# Patient Record
Sex: MALE | Race: ASIAN | NOT HISPANIC OR LATINO | Employment: FULL TIME | URBAN - METROPOLITAN AREA
[De-identification: names, ages, dates, MRNs, and addresses within clinical notes are randomized per-mention and may not be internally consistent; named-entity substitution may affect disease eponyms.]

---

## 2023-12-25 ENCOUNTER — HOSPITAL ENCOUNTER (EMERGENCY)
Facility: HOSPITAL | Age: 38
Discharge: HOME | End: 2023-12-25
Attending: STUDENT IN AN ORGANIZED HEALTH CARE EDUCATION/TRAINING PROGRAM | Admitting: STUDENT IN AN ORGANIZED HEALTH CARE EDUCATION/TRAINING PROGRAM
Payer: COMMERCIAL

## 2023-12-25 ENCOUNTER — APPOINTMENT (OUTPATIENT)
Dept: CT IMAGING | Facility: HOSPITAL | Age: 38
End: 2023-12-25
Payer: COMMERCIAL

## 2023-12-25 VITALS
TEMPERATURE: 97.9 F | DIASTOLIC BLOOD PRESSURE: 82 MMHG | SYSTOLIC BLOOD PRESSURE: 119 MMHG | RESPIRATION RATE: 20 BRPM | WEIGHT: 123.46 LBS | HEIGHT: 66 IN | BODY MASS INDEX: 19.84 KG/M2 | HEART RATE: 105 BPM | OXYGEN SATURATION: 97 %

## 2023-12-25 DIAGNOSIS — M54.50 ACUTE MIDLINE LOW BACK PAIN, UNSPECIFIED WHETHER SCIATICA PRESENT: Primary | ICD-10-CM

## 2023-12-25 DIAGNOSIS — M62.838 MUSCLE SPASM: ICD-10-CM

## 2023-12-25 PROCEDURE — 74176 CT ABD & PELVIS W/O CONTRAST: CPT

## 2023-12-25 PROCEDURE — 63710000001 PREDNISONE PER 1 MG: Performed by: STUDENT IN AN ORGANIZED HEALTH CARE EDUCATION/TRAINING PROGRAM

## 2023-12-25 PROCEDURE — 25010000002 KETOROLAC TROMETHAMINE PER 15 MG: Performed by: STUDENT IN AN ORGANIZED HEALTH CARE EDUCATION/TRAINING PROGRAM

## 2023-12-25 PROCEDURE — 72131 CT LUMBAR SPINE W/O DYE: CPT

## 2023-12-25 PROCEDURE — 99284 EMERGENCY DEPT VISIT MOD MDM: CPT

## 2023-12-25 PROCEDURE — 96372 THER/PROPH/DIAG INJ SC/IM: CPT

## 2023-12-25 RX ORDER — METHOCARBAMOL 750 MG/1
750 TABLET, FILM COATED ORAL ONCE
Status: COMPLETED | OUTPATIENT
Start: 2023-12-25 | End: 2023-12-25

## 2023-12-25 RX ORDER — METHOCARBAMOL 500 MG/1
500 TABLET, FILM COATED ORAL 3 TIMES DAILY PRN
Qty: 30 TABLET | Refills: 0 | Status: SHIPPED | OUTPATIENT
Start: 2023-12-25

## 2023-12-25 RX ORDER — KETOROLAC TROMETHAMINE 15 MG/ML
15 INJECTION, SOLUTION INTRAMUSCULAR; INTRAVENOUS ONCE
Status: COMPLETED | OUTPATIENT
Start: 2023-12-25 | End: 2023-12-25

## 2023-12-25 RX ORDER — PREDNISONE 50 MG/1
50 TABLET ORAL DAILY
Qty: 5 TABLET | Refills: 0 | Status: SHIPPED | OUTPATIENT
Start: 2023-12-25

## 2023-12-25 RX ORDER — OXYCODONE AND ACETAMINOPHEN 7.5; 325 MG/1; MG/1
1 TABLET ORAL ONCE
Status: COMPLETED | OUTPATIENT
Start: 2023-12-25 | End: 2023-12-25

## 2023-12-25 RX ORDER — PREDNISONE 20 MG/1
60 TABLET ORAL ONCE
Status: COMPLETED | OUTPATIENT
Start: 2023-12-25 | End: 2023-12-25

## 2023-12-25 RX ORDER — KETOROLAC TROMETHAMINE 10 MG/1
10 TABLET, FILM COATED ORAL EVERY 6 HOURS PRN
Qty: 30 TABLET | Refills: 0 | Status: SHIPPED | OUTPATIENT
Start: 2023-12-25

## 2023-12-25 RX ADMIN — METHOCARBAMOL TABLETS 750 MG: 750 TABLET, COATED ORAL at 19:23

## 2023-12-25 RX ADMIN — OXYCODONE AND ACETAMINOPHEN 1 TABLET: 7.5; 325 TABLET ORAL at 19:23

## 2023-12-25 RX ADMIN — KETOROLAC TROMETHAMINE 15 MG: 15 INJECTION, SOLUTION INTRAMUSCULAR; INTRAVENOUS at 21:36

## 2023-12-25 RX ADMIN — PREDNISONE 60 MG: 20 TABLET ORAL at 19:23

## 2023-12-26 NOTE — ED PROVIDER NOTES
EMERGENCY DEPARTMENT ENCOUNTER    Room Number:  12/12  PCP: System, Provider Not In  History obtained from: Patient      HPI:  Chief Complaint: Low back pain  A complete HPI/ROS/PMH/PSH/SH/FH are unobtainable due to: Not applicable  Context: Mali Orlando is a 38 y.o. male who presents to the ED c/o low back pain.  Onset last night, feels like a spasm.  Difficulty moving due to severe pain.  Reports he has had similar episodes in the past where the muscles in his upper back have locked up and he has been paralyzed for some period.  This is happened to him twice, resolved after 3 to 4 weeks.  Patient denies any numbness or weakness.  No bladder or bowel incontinence.  No known injury.  No radiation down the legs.            PAST MEDICAL HISTORY  Active Ambulatory Problems     Diagnosis Date Noted    No Active Ambulatory Problems     Resolved Ambulatory Problems     Diagnosis Date Noted    No Resolved Ambulatory Problems     No Additional Past Medical History         PAST SURGICAL HISTORY  No past surgical history on file.      FAMILY HISTORY  No family history on file.      SOCIAL HISTORY  Social History     Socioeconomic History    Marital status:          ALLERGIES  Patient has no known allergies.        REVIEW OF SYSTEMS    As per HPI      PHYSICAL EXAM  ED Triage Vitals   Temp Heart Rate Resp BP SpO2   12/25/23 1900 12/25/23 1900 12/25/23 1900 12/25/23 1906 12/25/23 1900   97.9 °F (36.6 °C) 105 20 119/82 97 %      Temp src Heart Rate Source Patient Position BP Location FiO2 (%)   12/25/23 1900 -- -- -- --   Tympanic           Physical Exam  Constitutional:       General: He is not in acute distress.  HENT:      Head: Normocephalic and atraumatic.   Pulmonary:      Effort: No respiratory distress.   Abdominal:      General: There is no distension.      Tenderness: There is no abdominal tenderness.   Musculoskeletal:         General: No swelling or deformity.      Comments: Tenderness palpation diffusely over  the lumbar spine, primarily over the lumbar sacral area, pain with straight leg raise bilaterally   Skin:     General: Skin is warm and dry.   Neurological:      General: No focal deficit present.      Mental Status: He is alert. Mental status is at baseline.           Vital signs and nursing notes reviewed.          LAB RESULTS  No results found for this or any previous visit (from the past 24 hour(s)).    Ordered the above labs and reviewed the results.        RADIOLOGY  CT Lumbar Spine Without Contrast, CT Abdomen Pelvis Without Contrast    Result Date: 12/25/2023  CT OF THE ABDOMEN AND PELVIS WITHOUT CONTRAST; CT LUMBAR SPINE  HISTORY: Low back pain  COMPARISON: None available.  TECHNIQUE: Axial CT imaging was obtained through the abdomen and pelvis. No IV contrast was administered. Axial CT imaging was obtained through the lumbar spine. Coronal and sagittal reformatted images were obtained.  FINDINGS: CT OF THE ABDOMEN AND PELVIS: Images through the lung bases demonstrate some minimal atelectasis. There is an 8 mm low-attenuation lesion within the right lobe of the liver, which is indeterminate on the basis of this study. It would be better assessed with liver protocol CT or MRI. The adrenal glands, gallbladder, spleen, and pancreas are all normal. No hydronephrosis is seen on either side. No distal ureteral or bladder stones are seen. Dystrophic calcification is noted within the prostate gland. There is no bowel obstruction. The patient's stomach is distended with fluid and debris. The duodenum is decompressed. Correlation with any history of gastroparesis or gastric outlet obstruction is recommended.  CT OF THE LUMBAR SPINE: No acute fracture or subluxation of the lumbar spine is seen. Intervertebral disc spaces appear well-maintained. Sacroiliac joints appear well-maintained. Lumbar vertebral body alignment is within normal limits.  T12-L1: There is no canal stenosis or neuroforaminal narrowing. L1-L2: There  is no canal stenosis or neuroforaminal narrowing. L2-L3: There is mild disc bulge, without significant canal stenosis or neuroforaminal narrowing. L3-L4: There is diffuse disc bulge. There is some mild canal narrowing, particularly on the right. There is some mild neuroforaminal narrowing on the right. L4-L5: There is diffuse disc bulge. There is mild canal narrowing. There is moderate neuroforaminal narrowing on the left and mild narrowing on the right. L5-S1: There is no canal stenosis. There is some mild neuroforaminal narrowing on the left.       1. Patient's stomach is distended with fluid and debris. Duodenum is decompressed. Correlation with any history of gastroparesis or gastric outlet obstruction is recommended. 2. No acute osseous findings. Degenerative changes, as described above.  Radiation dose reduction techniques were utilized, including automated exposure control and exposure modulation based on body size.   This report was finalized on 12/25/2023 8:04 PM by Dr. Carlota Loyola M.D on Workstation: [id number]       Ordered the above noted radiological studies. Reviewed by me in PACS.              MEDICATIONS GIVEN IN ER  Medications   oxyCODONE-acetaminophen (PERCOCET) 7.5-325 MG per tablet 1 tablet (1 tablet Oral Given 12/25/23 1923)   methocarbamol (ROBAXIN) tablet 750 mg (750 mg Oral Given 12/25/23 1923)   predniSONE (DELTASONE) tablet 60 mg (60 mg Oral Given 12/25/23 1923)   ketorolac (TORADOL) injection 15 mg (15 mg Intramuscular Given 12/25/23 2136)               MEDICAL DECISION MAKING, PROGRESS, and CONSULTS    MDM: Patient presented the emergency department with severe low back pain, acute onset.  Otherwise well-appearing, vitals otherwise stable.  Imaging demonstrating multiple levels with neuroforaminal stenosis, no obvious culprit for his acute onset of pain.  Received pain medication in the emergency department with minimal improvement.  After ketorolac patient had significant  improvement and was able to ambulate in the emergency department.  Will discharge with supportive care and outpatient follow-up.  Given return precautions and discharged home.    All labs have been independently reviewed by me.  All radiology studies have been reviewed by me and I have also reviewed the radiology report.   EKG's independently viewed and interpreted by me.  Discussion below represents my analysis of pertinent findings related to patient's condition, differential diagnosis, treatment plan and final disposition.      Additional sources:  - Discussed/ obtained information from independent historians: Friend    - External (non-ED) record review:     - Chronic or social conditions impacting care:     - Shared decision making: Discussed plan for discharge with outpatient follow-up, patient agrees      Orders placed during this visit:  Orders Placed This Encounter   Procedures    CT Lumbar Spine Without Contrast    CT Abdomen Pelvis Without Contrast    Ambulatory Referral to Neurosurgery         Additional orders considered but not ordered:  Considered MRI however patient ambulatory in the ER, no indication at this time.        Differential diagnosis includes but is not limited to:    Fracture, herniated disc, muscle spasm, cord syndrome, cauda equina      Independent interpretation of labs, radiology studies, and discussions with consultants:           DIAGNOSIS  Final diagnoses:   Acute midline low back pain, unspecified whether sciatica present   Muscle spasm         DISPOSITION  Discharged home        Latest Documented Vital Signs:  As of 21:56 EST  BP- 119/82 HR- 105 Temp- 97.9 °F (36.6 °C) (Tympanic) O2 sat- 97%              --    Please note that portions of this were completed with a voice recognition program.       Note Disclaimer: At [facility], we believe that sharing information builds trust and better relationships. You are receiving this note because you are receiving care at [facility] or recently visited. It is possible you will see health information before a provider has talked with you about it. This kind of information can be easy to misunderstand. To help you fully understand what it means for your health, we urge you to discuss this note with your provider.             Edinson Gunn MD  12/25/23 1324

## 2024-12-13 ENCOUNTER — APPOINTMENT (OUTPATIENT)
Dept: GENERAL RADIOLOGY | Facility: HOSPITAL | Age: 39
End: 2024-12-13
Payer: COMMERCIAL

## 2024-12-13 ENCOUNTER — HOSPITAL ENCOUNTER (EMERGENCY)
Facility: HOSPITAL | Age: 39
Discharge: HOME | End: 2024-12-13
Attending: EMERGENCY MEDICINE
Payer: COMMERCIAL

## 2024-12-13 VITALS
SYSTOLIC BLOOD PRESSURE: 127 MMHG | RESPIRATION RATE: 18 BRPM | HEART RATE: 84 BPM | WEIGHT: 156 LBS | TEMPERATURE: 97.3 F | HEIGHT: 65 IN | BODY MASS INDEX: 25.99 KG/M2 | DIASTOLIC BLOOD PRESSURE: 81 MMHG | OXYGEN SATURATION: 94 %

## 2024-12-13 DIAGNOSIS — M54.6 ACUTE MIDLINE THORACIC BACK PAIN: Primary | ICD-10-CM

## 2024-12-13 PROCEDURE — 25010000002 HYDROMORPHONE 1 MG/ML SOLUTION: Performed by: EMERGENCY MEDICINE

## 2024-12-13 PROCEDURE — 63710000001 ONDANSETRON ODT 4 MG TABLET DISPERSIBLE: Performed by: EMERGENCY MEDICINE

## 2024-12-13 PROCEDURE — 96372 THER/PROPH/DIAG INJ SC/IM: CPT

## 2024-12-13 PROCEDURE — 99283 EMERGENCY DEPT VISIT LOW MDM: CPT

## 2024-12-13 PROCEDURE — 25010000002 KETOROLAC TROMETHAMINE PER 15 MG: Performed by: PHYSICIAN ASSISTANT

## 2024-12-13 RX ORDER — BACLOFEN 10 MG/1
10 TABLET ORAL 3 TIMES DAILY PRN
Qty: 20 TABLET | Refills: 0 | Status: SHIPPED | OUTPATIENT
Start: 2024-12-13 | End: 2024-12-13

## 2024-12-13 RX ORDER — ONDANSETRON 4 MG/1
4 TABLET, ORALLY DISINTEGRATING ORAL ONCE
Status: COMPLETED | OUTPATIENT
Start: 2024-12-13 | End: 2024-12-13

## 2024-12-13 RX ORDER — KETOROLAC TROMETHAMINE 30 MG/ML
30 INJECTION, SOLUTION INTRAMUSCULAR; INTRAVENOUS ONCE
Status: COMPLETED | OUTPATIENT
Start: 2024-12-13 | End: 2024-12-13

## 2024-12-13 RX ORDER — PREDNISONE 50 MG/1
50 TABLET ORAL DAILY
Qty: 7 TABLET | Refills: 0 | Status: SHIPPED | OUTPATIENT
Start: 2024-12-13

## 2024-12-13 RX ORDER — BACLOFEN 10 MG/1
10 TABLET ORAL ONCE
Status: COMPLETED | OUTPATIENT
Start: 2024-12-13 | End: 2024-12-13

## 2024-12-13 RX ORDER — PREDNISONE 50 MG/1
50 TABLET ORAL DAILY
Qty: 7 TABLET | Refills: 0 | Status: SHIPPED | OUTPATIENT
Start: 2024-12-13 | End: 2024-12-13

## 2024-12-13 RX ORDER — BACLOFEN 10 MG/1
10 TABLET ORAL 3 TIMES DAILY PRN
Qty: 20 TABLET | Refills: 0 | Status: SHIPPED | OUTPATIENT
Start: 2024-12-13

## 2024-12-13 RX ADMIN — ONDANSETRON 4 MG: 4 TABLET, ORALLY DISINTEGRATING ORAL at 06:10

## 2024-12-13 RX ADMIN — HYDROMORPHONE HYDROCHLORIDE 1 MG: 1 INJECTION, SOLUTION INTRAMUSCULAR; INTRAVENOUS; SUBCUTANEOUS at 06:11

## 2024-12-13 RX ADMIN — BACLOFEN 10 MG: 10 TABLET ORAL at 06:09

## 2024-12-13 RX ADMIN — KETOROLAC TROMETHAMINE 30 MG: 30 INJECTION, SOLUTION INTRAMUSCULAR at 07:25

## 2024-12-13 NOTE — ED PROVIDER NOTES
EMERGENCY DEPARTMENT ENCOUNTER    Room Number:  02/02  Date of encounter:  12/13/2024  PCP: Noman Akins APRN  Historian: Patient, family  Chronic or social conditions impacting care (social determinants of health): Nothing  Friend providing interpretation.    HPI:  Chief Complaint: Back pain  A complete HPI/ROS/PMH/PSH/SH/FH are unobtainable due to: Nothing    Context: Mali Orlando is a 39 y.o. male, who presents to the ED c/o acute intrascapular back pain since yesterday at noon.  Patient denies any trauma.  Patient has a recurrent history of similar pain in the past.  Patient states this pain seems to occur every winter.  He has had multiple ER visits for similar complaints.  He describes a sharp, stabbing pain in the intrascapular area without radiation.  Denies any chest pain, shortness of breath.  He denies any pain, numbness, tingling of the arms.    Review of prior external notes (non-ED):   Reviewed neurosurgery office visit from 12/1/2020.  Patient seen for back pain.  He was referred to PT.    Review of prior external test results outside of this encounter:  Reviewed a CT of the lumbar spine dated 12/25/2023.  This showed no significant osseous findings.    PAST MEDICAL HISTORY  Active Ambulatory Problems     Diagnosis Date Noted    No Active Ambulatory Problems     Resolved Ambulatory Problems     Diagnosis Date Noted    No Resolved Ambulatory Problems     No Additional Past Medical History         PAST SURGICAL HISTORY  History reviewed. No pertinent surgical history.      FAMILY HISTORY  History reviewed. No pertinent family history.      SOCIAL HISTORY  Social History     Socioeconomic History    Marital status:          ALLERGIES  Patient has no known allergies.        REVIEW OF SYSTEMS  All systems reviewed and negative except for those discussed in HPI.       PHYSICAL EXAM    I have reviewed the triage vital signs and nursing notes.    ED Triage Vitals   Temp Heart Rate Resp BP SpO2    12/13/24 0505 12/13/24 0505 12/13/24 0505 12/13/24 0509 12/13/24 0505   97.3 °F (36.3 °C) 98 18 125/99 97 %      Temp src Heart Rate Source Patient Position BP Location FiO2 (%)   12/13/24 0505 12/13/24 0505 -- -- --   Tympanic Monitor          Physical Exam  GENERAL: Alert, oriented, not distressed  HENT: head atraumatic, no nuchal rigidity  EYES: no scleral icterus, EOMI  CV: regular rhythm, regular rate, no murmur  RESPIRATORY: normal effort, CTA  ABDOMEN: soft, nontender  MUSCULOSKELETAL: Mild tenderness in the intrascapular area.  Decreased range of motion of the thoracic spine.  NEURO: 5/5 strength in bilateral upper and lower extremities.  Ambulates well.  SKIN: warm, dry      MEDICATIONS GIVEN IN ER    Medications   HYDROmorphone (DILAUDID) injection 1 mg (1 mg Intramuscular Given 12/13/24 0611)   ondansetron ODT (ZOFRAN-ODT) disintegrating tablet 4 mg (4 mg Oral Given 12/13/24 0610)   baclofen (LIORESAL) tablet 10 mg (10 mg Oral Given 12/13/24 0609)   ketorolac (TORADOL) injection 30 mg (30 mg Intramuscular Given 12/13/24 0725)         ADDITIONAL ORDERS CONSIDERED BUT NOT ORDERED:  Admission was considered but after careful review of the patient's presentation, physical examination, diagnostic results, and response to treatment the patient may be safely discharged with outpatient follow-up.       PROGRESS, DATA ANALYSIS, CONSULTS, AND MEDICAL DECISION MAKING    All labs have been independently interpreted by myself.  All radiology studies have been independently interpreted by myself and discussed with radiologist dictating the report.   EKGs independently interpreted by myself.  Discussion below represents my analysis of pertinent findings related to patient's condition, differential diagnosis, treatment plan and final disposition.    I have discussed case with Dr. Mishra, emergency room physician.  He has performed his own bedside examination and agrees with treatment plan.    ED Course as of 12/13/24  0822   Fri Dec 13, 2024   0607 Patient presents with atraumatic intrascapular back pain since yesterday.  Patient has a recurrent history of this.  He has had multiple ER visits with similar complaints and negative imaging.  He has no radicular symptoms.   Denies any chest pain or shortness of breath.  Plan for pain control and reevaluation.  [EE]   0721 Recheck of patient.  He states his pain is improved however not resolved.  Will give an additional shot of Toradol. [EE]   0757 Patient states his pain is improved.  Again he has no chest pain, shortness of breath.  His pain appears muscular.  He has no neurological deficits or radiating pain.  He has been evaluated by neurosurgery in the past.  I do not believe any further imaging is warranted at this time.  We will discharge with steroids and muscle relaxers. [EE]      ED Course User Index  [EE] Adama Wade PA       AS OF 08:22 EST VITALS:    BP - 127/81  HR - 84  TEMP - 97.3 °F (36.3 °C) (Tympanic)  O2 SATS - 94%        DIAGNOSIS  Final diagnoses:   Acute midline thoracic back pain         DISPOSITION  Discharged    Admission was considered but after careful review of the patient's presentation, physical examination, diagnostic results, and response to treatment the patient may be safely discharged with outpatient follow-up.         Dictated utilizing Dragon dictation     Adama Wade PA  12/13/24 0822

## 2024-12-14 ENCOUNTER — HOSPITAL ENCOUNTER (EMERGENCY)
Facility: HOSPITAL | Age: 39
Discharge: HOME | End: 2024-12-14
Attending: EMERGENCY MEDICINE
Payer: COMMERCIAL

## 2024-12-14 ENCOUNTER — APPOINTMENT (OUTPATIENT)
Dept: CT IMAGING | Facility: HOSPITAL | Age: 39
End: 2024-12-14
Payer: COMMERCIAL

## 2024-12-14 VITALS
TEMPERATURE: 99 F | DIASTOLIC BLOOD PRESSURE: 81 MMHG | HEART RATE: 74 BPM | OXYGEN SATURATION: 99 % | RESPIRATION RATE: 18 BRPM | SYSTOLIC BLOOD PRESSURE: 111 MMHG

## 2024-12-14 DIAGNOSIS — M54.6 ACUTE MIDLINE THORACIC BACK PAIN: Primary | ICD-10-CM

## 2024-12-14 PROCEDURE — 96374 THER/PROPH/DIAG INJ IV PUSH: CPT

## 2024-12-14 PROCEDURE — 99284 EMERGENCY DEPT VISIT MOD MDM: CPT

## 2024-12-14 PROCEDURE — 72128 CT CHEST SPINE W/O DYE: CPT

## 2024-12-14 PROCEDURE — 25010000002 HYDROMORPHONE 1 MG/ML SOLUTION: Performed by: EMERGENCY MEDICINE

## 2024-12-14 RX ORDER — SODIUM CHLORIDE 0.9 % (FLUSH) 0.9 %
10 SYRINGE (ML) INJECTION AS NEEDED
Status: DISCONTINUED | OUTPATIENT
Start: 2024-12-14 | End: 2024-12-14 | Stop reason: HOSPADM

## 2024-12-14 RX ORDER — LIDOCAINE 4 G/G
1 PATCH TOPICAL
Status: DISCONTINUED | OUTPATIENT
Start: 2024-12-14 | End: 2024-12-14 | Stop reason: HOSPADM

## 2024-12-14 RX ADMIN — HYDROMORPHONE HYDROCHLORIDE 1 MG: 1 INJECTION, SOLUTION INTRAMUSCULAR; INTRAVENOUS; SUBCUTANEOUS at 08:32

## 2024-12-14 RX ADMIN — LIDOCAINE PAIN RELIEF 1 PATCH: 560 PATCH TOPICAL at 10:51

## 2024-12-14 NOTE — ED PROVIDER NOTES
EMERGENCY DEPARTMENT ENCOUNTER      PCP: Noman Aikns APRN  Patient Care Team:  Noman Akins APRN as PCP - General (Nurse Practitioner)   Independent Historians: Patient via     HPI:  Chief Complaint: Back pain   A complete HPI/ROS/PMH/PSH/SH/FH are unobtainable due to: None    Chronic or social conditions impacting patient care (social determinants of health): None    Context: Mali Orlando is a 39 y.o. male who presents to the ED c/o acute thoracic back pain. Pt reports he was seen in ER yesterday for same He states he has had back pain for the past 15 ears approximately. Denies recent injury. [place] same complaint but they were not able to figure out the etiology of his pain. He denies fevers, chills, previous spine surgeries. Pt reports he did not feel he could walk this morning so his family brought him in.    Review of prior external notes and/or external test results outside of this encounter: XR T spine on 12/1/20 showed mild dextroscoliosis, no fracture or subluxation, no lytic or blastic lesion, mild spondylosis. CT lumbar spine on 12/25/23 showed diffuse disc bulge at L3-L4 and L4-L5. No significant canal stenosis.      PAST MEDICAL HISTORY  Active Ambulatory Problems     Diagnosis Date Noted    No Active Ambulatory Problems     Resolved Ambulatory Problems     Diagnosis Date Noted    No Resolved Ambulatory Problems     No Additional Past Medical History       The patient has started, but not completed, their COVID-19 vaccination series.    PAST SURGICAL HISTORY  No past surgical history on file.      FAMILY HISTORY  No family history on file.      SOCIAL HISTORY  Social History     Socioeconomic History    Marital status:          ALLERGIES  Patient has no known allergies.        REVIEW OF SYSTEMS  Review of Systems   Constitutional:  Negative for chills and fever.   Respiratory:  Negative for shortness of breath.     Cardiovascular:  Negative for chest pain.   Gastrointestinal:  Negative for abdominal pain.   Musculoskeletal:  Positive for back pain. Negative for neck pain.        All systems reviewed and negative except for those discussed in HPI.       PHYSICAL EXAM    I have reviewed the triage vital signs and nursing notes.    ED Triage Vitals   Temp Heart Rate Resp BP SpO2   12/14/24 0752 12/14/24 0752 12/14/24 0752 12/14/24 0806 12/14/24 0752   99 °F (37.2 °C) 78 16 (!) 135/106 98 %      Temp src Heart Rate Source Patient Position BP Location FiO2 (%)   -- -- -- 12/14/24 0806 --      Right arm        Physical Exam  GENERAL: alert, no acute distress  SKIN: Warm, dry  HENT: Normocephalic, atraumatic  EYES: no scleral icterus  CV: regular rhythm, regular rate  RESPIRATORY: normal effort, lungs clear  ABDOMEN: soft, nontender, nondistended  MUSCULOSKELETAL: no deformity, no edema, there is some midline upper thoracic ttp  NEURO: alert, he is moving all extremities and with distraction moves them quite well, appear to have normal strength, no focal deficits          LAB RESULTS  No results found for this or any previous visit (from the past 24 hours).    Ordered the above labs and independently reviewed and interpreted the results.        RADIOLOGY  CT Thoracic Spine Without Contrast    Result Date: 12/14/2024  CT THORACIC SPINE WO CONTRAST-  INDICATIONS: Pain. Radiation dose reduction techniques were utilized, including automated exposure control and exposure modulation based on body size.  TECHNIQUE: Noncontrast thoracic spine CT  COMPARISON: None available  FINDINGS:  No acute fracture or malalignment is identified.  No prominent osseous narrowing of the neural foramina.  No high-grade central stenosis is identified without the benefit of intrathecal contrast material.  Mild dependent atelectasis is present in both lungs.  If further imaging evaluation spine is indicated, MRI or CT myelogram could be considered.       As  described.    This report was finalized on 12/14/2024 10:28 AM by Dr. Jamaal Bauer M.D on Workstation: [id number]       I ordered the above noted radiological studies. Independently reviewed and interpreted by me.  See dictation for official radiology interpretation.      PROCEDURES    Procedures      MEDICATIONS GIVEN IN ER    Medications   HYDROmorphone (DILAUDID) injection 1 mg (1 mg Intravenous Given 12/14/24 0832)         PROGRESS, DATA ANALYSIS, CONSULTS, AND MEDICAL DECISION MAKING    All labs have been independently reviewed and interpreted by me.  All radiology studies have been independently reviewed and interpreted by me and discussed with radiologist dictating the report.   EKG's independently reviewed and interpreted by me.  Discussion below represents my analysis of pertinent findings related to patient's condition, differential diagnosis, treatment plan and final disposition.    My differential diagnosis for back pain includes but is not limited to:  Musculoskeletal strain, contusion, retroperitoneal hematoma, disc protrusion, vertebral fracture, transverse process fracture, rib fracture, facet syndrome, sacroiliac joint strain, sciatica, renal injury, splenic injury, pancreatic injury, osteoarthritis, lumbar spondylosis, spinal stenosis, ankylosing spondylitis, sacroiliac joint inflammation, pancreatitis, perforated peptic ulcer, diverticulitis, epidural abscess, osteomyelitis, retroperitoneal abscess, pyelonephritis, pneumonia, subphrenic abscess, tuberculosis, neurofibroma, meningioma, multiple myeloma, lymphoma, metastatic cancer, primary cancer, AAA, aortic dissection, spinal ischemia, referred pain, ureterolithiasis      ED Course as of 12/14/24 1438   Sat Dec 14, 2024   1040 CT Thoracic Spine Without Contrast  Radiology study independently interpreted by me and my findings are no acute fracture.   [DC]   1056 Pt was able to ambulate without difficulty in sh[place]. I will provide outpatient  follow up for further evaluation of his chronic back pain. [DC]      ED Course User Index  [DC] Светлана Malik PA             AS OF 14:38 EST VITALS:    BP - 111/81  HR - 74  TEMP - 99 °F (37.2 °C)  O2 SATS - 99%        DIAGNOSIS  Final diagnoses:   Acute midline thoracic back pain         DISPOSITION  ED Disposition       ED Disposition   Discharge    Condition   Stable    Comment   --                  Note Disclaimer: At [facility], we believe that sharing information builds trust and better relationships. You are receiving this note because you recently visited [facility]. It is possible you will see health information before a provider has talked with you about it. This kind of information can be easy to misunderstand. To help you fully understand what it means for your health, we urge you to discuss this note with your provider.         Светлана Malik PA  12/14/24 3386

## 2024-12-14 NOTE — ED PROVIDER NOTES
SHARED VISIT: This visit was performed by BOTH a physician and an APC. The substantive portion of the medical decision making was performed by this attesting physician who made or approved the management plan and takes responsibility for patient management. All studies in the APC note (if performed) were independently interpreted by me.      The BRITTNY and I have discussed this patient's history, physical exam, and treatment plan.  I have reviewed the documentation and personally had a face to face interaction with the patient. I affirm the documentation and agree with the treatment and plan.  The attached note describes my personal findings.       I provided a substantive portion of the care of the patient.  I personally performed the physical exam in its entirety, and below are my findings.        History  39-year-old male with history of chronic back pain presents with worsening pain in the upper thoracic spine.    Physical Exam  Vital Signs reviewed  GENERAL: Alert male no obvious distress.  Triage vitals reviewed and notable for afebrile.  Heart rate blood pressure and O2 sats benign  HENT: nares patent  EYES: no scleral icterus  CV: regular rhythm, regular rate  RESPIRATORY: normal effort  ABDOMEN: soft  MUSCULOSKELETAL: Diffuse tenderness to palpation about the thoracic spine without noted bony deformity.  Upper extremities-unremarkable, good range of motion  Lower extremities-unremarkable, good range of motion  NEURO: Strength sensation and coordination are grossly intact.  Speech and mentation are unremarkable  SKIN: warm, dry      Assessment and Data Review    ED Course as of 12/14/24 1443   Sat Dec 14, 2024   1040 CT Thoracic Spine Without Contrast  Radiology study independently interpreted by me and my findings are no acute fracture.   [DC]   1056 Pt was able to ambulate without difficulty in [place]. I will provide outpatient follow up for further evaluation of his chronic back pain. [DC]      ED Course User  "Index  [DC] Светлана Malik, PA       I did discuss treatment and evaluation of this patient with AMOR Malik.    I did independently interpret and evaluate ED testing which is notable for the following:    CT imaging of the thoracic spine independently interpreted by me shows some chronic appearing arthritic changes without obvious fracture or major disc bulge.    At this point I see no \"red flags\" to suggest neurosurgical emergency.  Will treat symptomatically and refer for outpatient follow-up     Chris Jon MD  12/14/24 1151       Chris Jon MD  12/14/24 1443    "

## 2024-12-15 ENCOUNTER — HOSPITAL ENCOUNTER (EMERGENCY)
Facility: HOSPITAL | Age: 39
Discharge: HOME | End: 2024-12-15
Attending: STUDENT IN AN ORGANIZED HEALTH CARE EDUCATION/TRAINING PROGRAM | Admitting: STUDENT IN AN ORGANIZED HEALTH CARE EDUCATION/TRAINING PROGRAM
Payer: COMMERCIAL

## 2024-12-15 VITALS
DIASTOLIC BLOOD PRESSURE: 111 MMHG | TEMPERATURE: 97.6 F | OXYGEN SATURATION: 97 % | RESPIRATION RATE: 16 BRPM | SYSTOLIC BLOOD PRESSURE: 130 MMHG | HEART RATE: 80 BPM

## 2024-12-15 DIAGNOSIS — M62.830 SPASM OF THORACIC BACK MUSCLE: Primary | ICD-10-CM

## 2024-12-15 PROCEDURE — 99283 EMERGENCY DEPT VISIT LOW MDM: CPT

## 2024-12-15 PROCEDURE — 96372 THER/PROPH/DIAG INJ SC/IM: CPT

## 2024-12-15 PROCEDURE — 25010000002 KETOROLAC TROMETHAMINE PER 15 MG: Performed by: STUDENT IN AN ORGANIZED HEALTH CARE EDUCATION/TRAINING PROGRAM

## 2024-12-15 RX ORDER — ACETAMINOPHEN 500 MG
1000 TABLET ORAL EVERY 6 HOURS PRN
Qty: 60 TABLET | Refills: 0 | Status: SHIPPED | OUTPATIENT
Start: 2024-12-15 | End: 2024-12-15

## 2024-12-15 RX ORDER — MELOXICAM 15 MG/1
15 TABLET ORAL DAILY
Qty: 30 TABLET | Refills: 0 | Status: SHIPPED | OUTPATIENT
Start: 2024-12-15 | End: 2024-12-15

## 2024-12-15 RX ORDER — LIDOCAINE 4 G/G
1 PATCH TOPICAL ONCE
Status: DISCONTINUED | OUTPATIENT
Start: 2024-12-15 | End: 2024-12-15 | Stop reason: HOSPADM

## 2024-12-15 RX ORDER — LIDOCAINE 50 MG/G
1 PATCH TOPICAL EVERY 24 HOURS
Qty: 12 PATCH | Refills: 0 | Status: SHIPPED | OUTPATIENT
Start: 2024-12-15 | End: 2024-12-15

## 2024-12-15 RX ORDER — LIDOCAINE 50 MG/G
1 PATCH TOPICAL EVERY 24 HOURS
Qty: 12 PATCH | Refills: 0 | Status: SHIPPED | OUTPATIENT
Start: 2024-12-15 | End: 2024-12-27

## 2024-12-15 RX ORDER — KETOROLAC TROMETHAMINE 30 MG/ML
30 INJECTION, SOLUTION INTRAMUSCULAR; INTRAVENOUS ONCE
Status: COMPLETED | OUTPATIENT
Start: 2024-12-15 | End: 2024-12-15

## 2024-12-15 RX ORDER — ACETAMINOPHEN 500 MG
1000 TABLET ORAL ONCE
Status: COMPLETED | OUTPATIENT
Start: 2024-12-15 | End: 2024-12-15

## 2024-12-15 RX ORDER — ACETAMINOPHEN 500 MG
1000 TABLET ORAL EVERY 6 HOURS PRN
Qty: 60 TABLET | Refills: 0 | Status: SHIPPED | OUTPATIENT
Start: 2024-12-15

## 2024-12-15 RX ORDER — OXYCODONE HYDROCHLORIDE 5 MG/1
5 TABLET ORAL ONCE
Status: COMPLETED | OUTPATIENT
Start: 2024-12-15 | End: 2024-12-15

## 2024-12-15 RX ORDER — MELOXICAM 15 MG/1
15 TABLET ORAL DAILY
Qty: 30 TABLET | Refills: 0 | Status: SHIPPED | OUTPATIENT
Start: 2024-12-15 | End: 2025-01-14

## 2024-12-15 RX ADMIN — ACETAMINOPHEN 1000 MG: 500 TABLET, FILM COATED ORAL at 09:01

## 2024-12-15 RX ADMIN — OXYCODONE HYDROCHLORIDE 5 MG: 5 TABLET ORAL at 09:01

## 2024-12-15 RX ADMIN — LIDOCAINE 1 PATCH: 4 PATCH TOPICAL at 09:11

## 2024-12-15 RX ADMIN — KETOROLAC TROMETHAMINE 30 MG: 30 INJECTION, SOLUTION INTRAMUSCULAR at 09:01

## 2024-12-15 NOTE — ED PROVIDER NOTES
EMERGENCY DEPARTMENT ENCOUNTER  Room Number:  32/32  PCP: Noman Akins APRN  Independent Historians: Patient and Family      HPI:  Chief Complaint: had concerns including Back Pain.     A complete HPI/ROS/PMH/PSH/SH/FH are unobtainable due to: None    Chronic or social conditions impacting patient care (Social Determinants of Health): None      Context: Mali Orlando is a 39 y.o. male with a medical history of chronic back pain who presents to the ED c/o acute upper back pain for 3 days.  This is patient's third visit to the hospital for this.  States that he has been taking his prescribed medicine but no other medications for it.  No known injury.  No other complaints at this time.    Patient has not taken any Tylenol or NSAIDs for his pain.  Only taking baclofen and prednisone.    Review of prior external notes (non-ED) -and- Review of prior external test results outside of this encounter:  CT T spine 12/14/2024, yesterday: FINDINGS:  No acute fracture or malalignment is identified.  No prominent osseous narrowing of the neural foramina.  No high-grade central stenosis is identified without the benefit of intrathecal contrast material.     Prescription drug monitoring program review: SHARON reviewed by Jaleel Mcguire MD       PAST MEDICAL HISTORY  Active Ambulatory Problems     Diagnosis Date Noted    No Active Ambulatory Problems     Resolved Ambulatory Problems     Diagnosis Date Noted    No Resolved Ambulatory Problems     No Additional Past Medical History         PAST SURGICAL HISTORY  No past surgical history on file.      FAMILY HISTORY  No family history on file.      SOCIAL HISTORY  Social History     Socioeconomic History    Marital status:          ALLERGIES  Patient has no known allergies.      REVIEW OF SYSTEMS  Review of Systems  Included in HPI  All systems reviewed and negative except for those discussed in HPI.      PHYSICAL EXAM    I have reviewed the triage vital signs and nursing  notes.    ED Triage Vitals   Temp Heart Rate Resp BP SpO2   12/15/24 0816 12/15/24 0816 12/15/24 0816 12/15/24 0824 12/15/24 0816   97.6 °F (36.4 °C) 80 16 (!) 130/111 97 %      Temp src Heart Rate Source Patient Position BP Location FiO2 (%)   -- -- -- -- --              Physical Exam  GENERAL: alert,  mildly uncomfortable appearing  SKIN: Warm, dry  HENT: Normocephalic, atraumatic  EYES: no scleral icterus  CV: regular rhythm, regular rate  RESPIRATORY: normal effort, lungs clear  ABDOMEN: soft, nontender, nondistended  MUSCULOSKELETAL: no deformity  NEURO: alert, moves all extremities, follows commands            LAB RESULTS  No results found for this or any previous visit (from the past 24 hours).      RADIOLOGY  No Radiology Exams Resulted Within Past 24 Hours      MEDICATIONS GIVEN IN ER  Medications   Lidocaine 4 % 1 patch (1 patch Transdermal Medication Applied 12/15/24 0911)   ketorolac (TORADOL) injection 30 mg (30 mg Intramuscular Given 12/15/24 0901)   acetaminophen (TYLENOL) tablet 1,000 mg (1,000 mg Oral Given 12/15/24 0901)   oxyCODONE (ROXICODONE) immediate release tablet 5 mg (5 mg Oral Given 12/15/24 0901)         ORDERS PLACED DURING THIS VISIT:  No orders of the defined types were placed in this encounter.        OUTPATIENT MEDICATION MANAGEMENT:  Current Facility-Administered Medications Ordered in Epic   Medication Dose Route Frequency Provider Last Rate Last Admin    Lidocaine 4 % 1 patch  1 patch Transdermal Once Jaleel Mcguire MD   1 patch at 12/15/24 0911     Current Outpatient Medications Ordered in Epic   Medication Sig Dispense Refill    acetaminophen (TYLENOL) 500 MG tablet Take 2 tablets by mouth Every 6 (Six) Hours As Needed for Moderate Pain. 60 tablet 0    baclofen (LIORESAL) 10 MG tablet Take 1 tablet by mouth 3 (Three) Times a Day As Needed for Muscle Spasms. 20 tablet 0    lidocaine (LIDODERM) 5 % Place 1 patch on the skin as directed by provider Daily for 12 days. Remove &  Discard patch within 12 hours or as directed by MD 12 patch 0    meloxicam (MOBIC) 15 MG tablet Take 1 tablet by mouth Daily for 30 days. As needed for moderate pain 30 tablet 0    predniSONE (DELTASONE) 50 MG tablet Take 1 tablet by mouth Daily. 7 tablet 0         PROCEDURES  Procedures            PROGRESS, DATA ANALYSIS, CONSULTS, AND MEDICAL DECISION MAKING  All labs have been independently interpreted by me.  All radiology studies have been reviewed by me. All EKG's have been independently viewed and interpreted by me.  Discussion below represents my analysis of pertinent findings related to patient's condition, differential diagnosis, treatment plan and final disposition.    Differential diagnosis includes but is not limited to thoracic muscle strain, compression fracture, radiculopathy.    Clinical Scores:                                     ED Course as of 12/15/24 1144   Sun Dec 15, 2024   0851 Patient here with 3 days of upper back pain.  States his muscles are locked up.  No heavy lifting or known injury.  This is his third visit to the ER for this.  He took his baclofen and prednisone.  He is not taking any Tylenol or NSAIDs.  No other complaints at this time.  On exam patient is uncomfortable,    Moving bilateral upper extremities, will not sit up  or roll to side for examination of upper back.    I reviewed patient's recent CT image from yesterday, no significant acute findings.  Will give patient multimodal pain control.  Patient and family at bedside are agreeable to plan [DN]   1139 Patient reassessed, pain resolved, sitting up and moving without discomfort [DN]   1140 Discussed plan to add NSAID and lidocaine patch to multimodal pain control, Tylenol.  Plan for follow-up with family doctor and neurosurgery as indicated.  Patient is agreeable plan for discharge [DN]      ED Course User Index  [DN] Jaleel Mcguire MD             AS OF 11:44 EST VITALS:    BP - (!) 130/111  HR - 80  TEMP - 97.6 °F  (36.4 °C)  O2 SATS - 97%    COMPLEXITY OF CARE  Admission was considered but after careful review of the patient's presentation, physical examination, diagnostic results, and response to treatment the patient may be safely discharged with outpatient follow-up.      DIAGNOSIS  Final diagnoses:   Spasm of thoracic back muscle         DISPOSITION  ED Disposition       ED Disposition   Discharge    Condition   Stable    Comment   --                Please note that portions of this document were completed with a voice recognition program.    Note Disclaimer: At [facility], we believe that sharing information builds trust and better relationships. You are receiving this note because you recently visited [facility]. It is possible you will see health information before a provider has talked with you about it. This kind of information can be easy to misunderstand. To help you fully understand what it means for your health, we urge you to discuss this note with your provider.         Jaleel Mcguire MD  12/15/24 0901       Jaleel Mcguire MD  12/15/24 1146

## 2024-12-16 ENCOUNTER — TELEPHONE (OUTPATIENT)
Dept: NEUROSURGERY | Facility: CLINIC | Age: 39
End: 2024-12-16

## 2024-12-16 NOTE — TELEPHONE ENCOUNTER
This patient requires an  for their appointment. Please see the  information below.     Caller: MARTITA BOUCHER  Relationship to patient: SELF  Best call back number: [phone]   Service:IPAD  Is  needs updated in registration: Y  Date of Appointment:1-30-25  Time of Appointment:10:00

## 2024-12-17 ENCOUNTER — APPOINTMENT (OUTPATIENT)
Dept: MRI IMAGING | Facility: HOSPITAL | Age: 39
End: 2024-12-17
Payer: COMMERCIAL

## 2024-12-17 ENCOUNTER — HOSPITAL ENCOUNTER (OUTPATIENT)
Facility: HOSPITAL | Age: 39
Setting detail: OBSERVATION
Discharge: HOME | End: 2024-12-18
Attending: EMERGENCY MEDICINE | Admitting: EMERGENCY MEDICINE
Payer: COMMERCIAL

## 2024-12-17 DIAGNOSIS — M54.9 CHRONIC UPPER BACK PAIN: Primary | ICD-10-CM

## 2024-12-17 DIAGNOSIS — R52 INTRACTABLE PAIN: ICD-10-CM

## 2024-12-17 DIAGNOSIS — G89.29 CHRONIC UPPER BACK PAIN: Primary | ICD-10-CM

## 2024-12-17 PROBLEM — M54.6 CHRONIC MIDLINE THORACIC BACK PAIN: Status: ACTIVE | Noted: 2024-12-17

## 2024-12-17 LAB
ALBUMIN SERPL-MCNC: 3.8 G/DL (ref 3.5–5.2)
ALBUMIN/GLOB SERPL: 1.6 G/DL
ALP SERPL-CCNC: 59 U/L (ref 39–117)
ALT SERPL W P-5'-P-CCNC: 49 U/L (ref 1–41)
ANION GAP SERPL CALCULATED.3IONS-SCNC: 9 MMOL/L (ref 5–15)
AST SERPL-CCNC: 25 U/L (ref 1–40)
BASOPHILS # BLD AUTO: 0.04 10*3/MM3 (ref 0–0.2)
BASOPHILS NFR BLD AUTO: 0.5 % (ref 0–1.5)
BILIRUB SERPL-MCNC: 0.4 MG/DL (ref 0–1.2)
BUN SERPL-MCNC: 12 MG/DL (ref 6–20)
BUN/CREAT SERPL: 16.4 (ref 7–25)
CALCIUM SPEC-SCNC: 8.5 MG/DL (ref 8.6–10.5)
CHLORIDE SERPL-SCNC: 107 MMOL/L (ref 98–107)
CO2 SERPL-SCNC: 24 MMOL/L (ref 22–29)
CREAT SERPL-MCNC: 0.73 MG/DL (ref 0.76–1.27)
DEPRECATED RDW RBC AUTO: 42.3 FL (ref 37–54)
EGFRCR SERPLBLD CKD-EPI 2021: 118 ML/MIN/1.73
EOSINOPHIL # BLD AUTO: 0.05 10*3/MM3 (ref 0–0.4)
EOSINOPHIL NFR BLD AUTO: 0.7 % (ref 0.3–6.2)
ERYTHROCYTE [DISTWIDTH] IN BLOOD BY AUTOMATED COUNT: 13.9 % (ref 12.3–15.4)
GLOBULIN UR ELPH-MCNC: 2.4 GM/DL
GLUCOSE SERPL-MCNC: 97 MG/DL (ref 65–99)
HCT VFR BLD AUTO: 42.6 % (ref 37.5–51)
HGB BLD-MCNC: 14.6 G/DL (ref 13–17.7)
IMM GRANULOCYTES # BLD AUTO: 0.02 10*3/MM3 (ref 0–0.05)
IMM GRANULOCYTES NFR BLD AUTO: 0.3 % (ref 0–0.5)
LIPASE SERPL-CCNC: 31 U/L (ref 13–60)
LYMPHOCYTES # BLD AUTO: 1.71 10*3/MM3 (ref 0.7–3.1)
LYMPHOCYTES NFR BLD AUTO: 23.3 % (ref 19.6–45.3)
MCH RBC QN AUTO: 29.2 PG (ref 26.6–33)
MCHC RBC AUTO-ENTMCNC: 34.3 G/DL (ref 31.5–35.7)
MCV RBC AUTO: 85.2 FL (ref 79–97)
MONOCYTES # BLD AUTO: 0.48 10*3/MM3 (ref 0.1–0.9)
MONOCYTES NFR BLD AUTO: 6.5 % (ref 5–12)
NEUTROPHILS NFR BLD AUTO: 5.04 10*3/MM3 (ref 1.7–7)
NEUTROPHILS NFR BLD AUTO: 68.7 % (ref 42.7–76)
NRBC BLD AUTO-RTO: 0 /100 WBC (ref 0–0.2)
PLATELET # BLD AUTO: 212 10*3/MM3 (ref 140–450)
PMV BLD AUTO: 11.1 FL (ref 6–12)
POTASSIUM SERPL-SCNC: 3.3 MMOL/L (ref 3.5–5.2)
PROT SERPL-MCNC: 6.2 G/DL (ref 6–8.5)
RBC # BLD AUTO: 5 10*6/MM3 (ref 4.14–5.8)
SODIUM SERPL-SCNC: 140 MMOL/L (ref 136–145)
WBC NRBC COR # BLD AUTO: 7.34 10*3/MM3 (ref 3.4–10.8)

## 2024-12-17 PROCEDURE — 99285 EMERGENCY DEPT VISIT HI MDM: CPT

## 2024-12-17 PROCEDURE — 83690 ASSAY OF LIPASE: CPT | Performed by: PHYSICIAN ASSISTANT

## 2024-12-17 PROCEDURE — 85025 COMPLETE CBC W/AUTO DIFF WBC: CPT | Performed by: PHYSICIAN ASSISTANT

## 2024-12-17 PROCEDURE — 25510000002 GADOBENATE DIMEGLUMINE 529 MG/ML SOLUTION: Performed by: EMERGENCY MEDICINE

## 2024-12-17 PROCEDURE — G0378 HOSPITAL OBSERVATION PER HR: HCPCS

## 2024-12-17 PROCEDURE — A9577 INJ MULTIHANCE: HCPCS | Performed by: EMERGENCY MEDICINE

## 2024-12-17 PROCEDURE — 72141 MRI NECK SPINE W/O DYE: CPT

## 2024-12-17 PROCEDURE — 72146 MRI CHEST SPINE W/O DYE: CPT

## 2024-12-17 PROCEDURE — 72157 MRI CHEST SPINE W/O & W/DYE: CPT

## 2024-12-17 PROCEDURE — 80053 COMPREHEN METABOLIC PANEL: CPT | Performed by: PHYSICIAN ASSISTANT

## 2024-12-17 PROCEDURE — 99203 OFFICE O/P NEW LOW 30 MIN: CPT | Performed by: NURSE PRACTITIONER

## 2024-12-17 RX ORDER — LIDOCAINE 4 G/G
1 PATCH TOPICAL ONCE
Status: DISCONTINUED | OUTPATIENT
Start: 2024-12-17 | End: 2024-12-18 | Stop reason: HOSPADM

## 2024-12-17 RX ORDER — ACETAMINOPHEN 325 MG/1
650 TABLET ORAL EVERY 4 HOURS PRN
Status: DISCONTINUED | OUTPATIENT
Start: 2024-12-17 | End: 2024-12-18 | Stop reason: HOSPADM

## 2024-12-17 RX ORDER — SODIUM CHLORIDE 0.9 % (FLUSH) 0.9 %
10 SYRINGE (ML) INJECTION AS NEEDED
Status: DISCONTINUED | OUTPATIENT
Start: 2024-12-17 | End: 2024-12-18 | Stop reason: HOSPADM

## 2024-12-17 RX ORDER — HYDROCODONE BITARTRATE AND ACETAMINOPHEN 5; 325 MG/1; MG/1
1 TABLET ORAL EVERY 6 HOURS PRN
Status: DISCONTINUED | OUTPATIENT
Start: 2024-12-17 | End: 2024-12-18 | Stop reason: HOSPADM

## 2024-12-17 RX ORDER — NALOXONE HCL 0.4 MG/ML
0.4 VIAL (ML) INJECTION
Status: DISCONTINUED | OUTPATIENT
Start: 2024-12-17 | End: 2024-12-18 | Stop reason: HOSPADM

## 2024-12-17 RX ORDER — ACETAMINOPHEN 160 MG/5ML
650 SOLUTION ORAL EVERY 4 HOURS PRN
Status: DISCONTINUED | OUTPATIENT
Start: 2024-12-17 | End: 2024-12-18 | Stop reason: HOSPADM

## 2024-12-17 RX ORDER — POLYETHYLENE GLYCOL 3350 17 G/17G
17 POWDER, FOR SOLUTION ORAL DAILY PRN
Status: DISCONTINUED | OUTPATIENT
Start: 2024-12-17 | End: 2024-12-18 | Stop reason: HOSPADM

## 2024-12-17 RX ORDER — BISACODYL 5 MG/1
5 TABLET, DELAYED RELEASE ORAL DAILY PRN
Status: DISCONTINUED | OUTPATIENT
Start: 2024-12-17 | End: 2024-12-18 | Stop reason: HOSPADM

## 2024-12-17 RX ORDER — HYDROMORPHONE HYDROCHLORIDE 1 MG/ML
0.5 INJECTION, SOLUTION INTRAMUSCULAR; INTRAVENOUS; SUBCUTANEOUS
Status: DISCONTINUED | OUTPATIENT
Start: 2024-12-17 | End: 2024-12-18 | Stop reason: HOSPADM

## 2024-12-17 RX ORDER — SODIUM CHLORIDE 9 MG/ML
40 INJECTION, SOLUTION INTRAVENOUS AS NEEDED
Status: DISCONTINUED | OUTPATIENT
Start: 2024-12-17 | End: 2024-12-18 | Stop reason: HOSPADM

## 2024-12-17 RX ORDER — KETOROLAC TROMETHAMINE 30 MG/ML
30 INJECTION, SOLUTION INTRAMUSCULAR; INTRAVENOUS ONCE
Status: DISCONTINUED | OUTPATIENT
Start: 2024-12-17 | End: 2024-12-18 | Stop reason: HOSPADM

## 2024-12-17 RX ORDER — METHOCARBAMOL 750 MG/1
750 TABLET, FILM COATED ORAL ONCE
Status: DISCONTINUED | OUTPATIENT
Start: 2024-12-17 | End: 2024-12-18 | Stop reason: HOSPADM

## 2024-12-17 RX ORDER — SODIUM CHLORIDE 0.9 % (FLUSH) 0.9 %
10 SYRINGE (ML) INJECTION EVERY 12 HOURS SCHEDULED
Status: DISCONTINUED | OUTPATIENT
Start: 2024-12-17 | End: 2024-12-18 | Stop reason: HOSPADM

## 2024-12-17 RX ORDER — BISACODYL 10 MG
10 SUPPOSITORY, RECTAL RECTAL DAILY PRN
Status: DISCONTINUED | OUTPATIENT
Start: 2024-12-17 | End: 2024-12-18 | Stop reason: HOSPADM

## 2024-12-17 RX ORDER — AMOXICILLIN 250 MG
2 CAPSULE ORAL 2 TIMES DAILY PRN
Status: DISCONTINUED | OUTPATIENT
Start: 2024-12-17 | End: 2024-12-18 | Stop reason: HOSPADM

## 2024-12-17 RX ORDER — ACETAMINOPHEN 650 MG/1
650 SUPPOSITORY RECTAL EVERY 4 HOURS PRN
Status: DISCONTINUED | OUTPATIENT
Start: 2024-12-17 | End: 2024-12-18 | Stop reason: HOSPADM

## 2024-12-17 RX ORDER — DEXAMETHASONE SODIUM PHOSPHATE 4 MG/ML
4 INJECTION, SOLUTION INTRA-ARTICULAR; INTRALESIONAL; INTRAMUSCULAR; INTRAVENOUS; SOFT TISSUE EVERY 6 HOURS
Status: DISCONTINUED | OUTPATIENT
Start: 2024-12-17 | End: 2024-12-18 | Stop reason: HOSPADM

## 2024-12-17 RX ORDER — OXYCODONE AND ACETAMINOPHEN 5; 325 MG/1; MG/1
1 TABLET ORAL ONCE
Status: DISCONTINUED | OUTPATIENT
Start: 2024-12-17 | End: 2024-12-17

## 2024-12-17 RX ORDER — METHOCARBAMOL 500 MG/1
500 TABLET, FILM COATED ORAL EVERY 8 HOURS PRN
Status: DISCONTINUED | OUTPATIENT
Start: 2024-12-17 | End: 2024-12-18 | Stop reason: HOSPADM

## 2024-12-17 RX ORDER — POTASSIUM CHLORIDE 750 MG/1
40 TABLET, FILM COATED, EXTENDED RELEASE ORAL EVERY 4 HOURS
Status: DISPENSED | OUTPATIENT
Start: 2024-12-17 | End: 2024-12-18

## 2024-12-17 RX ORDER — LIDOCAINE 4 G/G
1 PATCH TOPICAL
Status: DISCONTINUED | OUTPATIENT
Start: 2024-12-17 | End: 2024-12-18 | Stop reason: HOSPADM

## 2024-12-17 RX ADMIN — GADOBENATE DIMEGLUMINE 14 ML: 529 INJECTION, SOLUTION INTRAVENOUS at 23:18

## 2024-12-17 RX ADMIN — POTASSIUM CHLORIDE 40 MEQ: 750 TABLET, EXTENDED RELEASE ORAL at 21:10

## 2024-12-17 RX ADMIN — Medication 10 ML: at 21:10

## 2024-12-17 NOTE — ED NOTES
Nursing report ED to floor  Mali Orlando  39 y.o.  male    HPI :  HPI  Stated Reason for Visit: back pain    Chief Complaint  Chief Complaint   Patient presents with    Back Pain       Admitting doctor:   Rakesh Reddy MD    Admitting diagnosis:   The primary encounter diagnosis was Chronic upper back pain. A diagnosis of Intractable pain was also pertinent to this visit.    Code status:   Current Code Status       Date Active Code Status Order ID Comments User Context       12/17/2024 1030 CPR (Attempt to Resuscitate) [id number]  Jenna Carreon PA-C ED        Question Answer    Code Status (Patient has no pulse and is not breathing) CPR (Attempt to Resuscitate)    Medical Interventions (Patient has pulse or is breathing) Full Support    Level Of Support Discussed With Patient                    Allergies:   Patient has no known allergies.    Isolation:   No active isolations    Intake and Output  No intake or output data in the 24 hours ending 12/17/24 1035    Weight:   There were no vitals filed for this visit.    Most recent vitals:   Vitals:    12/17/24 0845 12/17/24 1001 12/17/24 1013   BP:  119/92    Pulse: 88 66 83   Resp: 16     Temp: 97.6 °F (36.4 °C)     TempSrc: Tympanic     SpO2: 98% 98% 99%       Active LDAs/IV Access:   Lines, Drains & Airways       Active LDAs       Name Placement date Placement time Site Days    Peripheral IV 12/17/24 1023 Right;Posterior Forearm 12/17/24  1023  Forearm  less than 1                    Labs (abnormal labs have a star):   Labs Reviewed   COMPREHENSIVE METABOLIC PANEL   LIPASE   CBC WITH AUTO DIFFERENTIAL   CBC AND DIFFERENTIAL    Narrative:     The following orders were created for panel order CBC & Differential.  Procedure                               Abnormality         Status                     ---------                               -----------         ------                     CBC Auto Differential[[id number]]                            In process                    Please view results for these tests on the individual orders.       EKG:   No orders to display       Meds given in ED:   Medications   sodium chloride 0.9 % flush 10 mL (has no administration in time range)   ketorolac (TORADOL) injection 30 mg (0 mg Intravenous Hold 12/17/24 1026)   Lidocaine 4 % 1 patch (0 patches Transdermal Hold 12/17/24 1026)   methocarbamol (ROBAXIN) tablet 750 mg (0 mg Oral Hold 12/17/24 1027)   sodium chloride 0.9 % flush 10 mL (has no administration in time range)   sodium chloride 0.9 % flush 10 mL (has no administration in time range)   sodium chloride 0.9 % infusion 40 mL (has no administration in time range)   Potassium Replacement - Follow Nurse / BPA Driven Protocol (has no administration in time range)   Magnesium Standard Dose Replacement - Follow Nurse / BPA Driven Protocol (has no administration in time range)   Phosphorus Replacement - Follow Nurse / BPA Driven Protocol (has no administration in time range)   Calcium Replacement - Follow Nurse / BPA Driven Protocol (has no administration in time range)   acetaminophen (TYLENOL) tablet 650 mg (has no administration in time range)     Or   acetaminophen (TYLENOL) 160 MG/5ML oral solution 650 mg (has no administration in time range)     Or   acetaminophen (TYLENOL) suppository 650 mg (has no administration in time range)   methocarbamol (ROBAXIN) tablet 500 mg (has no administration in time range)   Lidocaine 4 % 1 patch (has no administration in time range)   HYDROcodone-acetaminophen (NORCO) 5-325 MG per tablet 1 tablet (has no administration in time range)   HYDROmorphone (DILAUDID) injection 0.5 mg (has no administration in time range)     And   naloxone (NARCAN) injection 0.4 mg (has no administration in time range)   sennosides-docusate (PERICOLACE) 8.6-50 MG per tablet 2 tablet (has no administration in time range)     And   polyethylene glycol (MIRALAX) packet 17 g (has no administration in time range)     And    bisacodyl (DULCOLAX) EC tablet 5 mg (has no administration in time range)     And   bisacodyl (DULCOLAX) suppository 10 mg (has no administration in time range)       Imaging results:  No radiology results for the last day    Ambulatory status:   - up ad evelio    Social issues:   Social History     Socioeconomic History    Marital status:        Peripheral Neurovascular  Peripheral Neurovascular (Adult)  Peripheral Neurovascular WDL: WDL, neurovascular assessment lower, pulse assessment  Pulse Assessment: dorsalis pedis  LLE Neurovascular Assessment  Temperature LLE: warm  Color LLE: no discoloration  Sensation LLE: no numbness, no tenderness  RLE Neurovascular Assessment  Temperature RLE: warm  Color RLE: no discoloration  Sensation RLE: no numbness, no tenderness    Neuro Cognitive  Neuro Cognitive (Adult)  Cognitive/Neuro/Behavioral WDL: WDL    Learning  Learning Assessment  Learning Readiness and Ability: language barrier identified  : other (see comments) (Pt states he does not need  unless talking to doctor;  offered)  Education Provided  Person Taught: patient  Teaching Method: verbal instruction  Teaching Focus: symptom/problem overview, risk factors/triggers, diagnostic test, medical device/equipment use  Education Outcome Evaluation: acceptance expressed    Respiratory  Respiratory WDL  Respiratory WDL: WDL    Abdominal Pain       Pain Assessments  Pain (Adult)  (0-10) Pain Rating: Rest: 9  (0-10) Pain Rating: Activity: 9  Pain Location: back    NIH Stroke Scale       Tammie Veliz RN  12/17/24 10:35 EST

## 2024-12-17 NOTE — ED PROVIDER NOTES
EMERGENCY DEPARTMENT ENCOUNTER      Room Number:  03/03  PCP: Noman Akins APRN  Independent Historians: Patient and Family  Patient Care Team:  Noman Akins APRN as PCP - General (Nurse Practitioner)       HPI:  Chief Complaint: Back pain    A complete HPI/ROS/PMH/PSH/SH/FH are unobtainable due to: None    Chronic or social conditions impacting patient care (Social Determinants of Health): None      Context: Mali Orlando is a 39 y.o. male with a PMH significant for chronic mid back pain who presents to the ED c/o acute aching and nonradiating upper back pain.  Symptoms have been present for the past several years but much worse in the past few weeks.  He has been evaluated in this ER 4 times over the past 5 days.  No numbness or weakness to the extremities, bowel or bladder incontinence, saddle paresthesias.  He reports that the medications that he been given in the ER helped his symptoms but no relief with the medications that he is been using at home.  He was prescribed meloxicam at his visit on the 15th but has not filled the medication at the pharmacy yet.      Upon review of prior external notes (non-ED) -and- Review of prior external test results outside of this encounter it appears the patient was evaluated in the office with spine surgery on 12/1/2020 for thoracic spine pain.        PAST MEDICAL HISTORY  Active Ambulatory Problems     Diagnosis Date Noted    No Active Ambulatory Problems     Resolved Ambulatory Problems     Diagnosis Date Noted    No Resolved Ambulatory Problems     No Additional Past Medical History         PAST SURGICAL HISTORY  No past surgical history on file.      FAMILY HISTORY  No family history on file.      SOCIAL HISTORY  Social History     Socioeconomic History    Marital status:          ALLERGIES  Patient has no known allergies.      REVIEW OF SYSTEMS  Included in HPI  All systems reviewed and negative except for those discussed in HPI.      PHYSICAL  EXAM    I have reviewed the triage vital signs and nursing notes.    ED Triage Vitals [12/17/24 0845]   Temp Heart Rate Resp BP SpO2   97.6 °F (36.4 °C) 88 16 -- 98 %      Temp src Heart Rate Source Patient Position BP Location FiO2 (%)   Tympanic Monitor -- -- --       Physical Exam  Constitutional:       General: He is not in acute distress.     Appearance: He is well-developed.   HENT:      Head: Normocephalic and atraumatic.   Eyes:      General: No scleral icterus.     Conjunctiva/sclera: Conjunctivae normal.   Neck:      Trachea: No tracheal deviation.   Cardiovascular:      Rate and Rhythm: Normal rate and regular rhythm.   Pulmonary:      Effort: Pulmonary effort is normal.      Breath sounds: Normal breath sounds.   Abdominal:      Palpations: Abdomen is soft.      Tenderness: There is no abdominal tenderness. There is no guarding.   Musculoskeletal:         General: No deformity.      Cervical back: Normal range of motion.      Thoracic back: Tenderness present. No bony tenderness. Normal range of motion.   Lymphadenopathy:      Cervical: No cervical adenopathy.   Skin:     General: Skin is warm and dry.   Neurological:      Mental Status: He is alert and oriented to person, place, and time.   Psychiatric:         Behavior: Behavior normal.         Vital signs and nursing notes reviewed.      PPE: I wore a surgical mask throughout my encounters with the pt. I performed hand hygiene on entry into the pt room and upon exit.     LAB RESULTS  No results found for this or any previous visit (from the past 24 hours).      RADIOLOGY  No Radiology Exams Resulted Within Past 24 Hours      MEDICATIONS GIVEN IN ER  Medications   sodium chloride 0.9 % flush 10 mL (has no administration in time range)   ketorolac (TORADOL) injection 30 mg (has no administration in time range)   Lidocaine 4 % 1 patch (has no administration in time range)   methocarbamol (ROBAXIN) tablet 750 mg (has no administration in time range)          ORDERS PLACED DURING THIS VISIT:  Orders Placed This Encounter   Procedures    Comprehensive Metabolic Panel    Lipase    CBC Auto Differential    Neurosurgery (on-call MD unless specified)    Insert Peripheral IV    Initiate ED Observation Status    CBC & Differential         OUTPATIENT MEDICATION MANAGEMENT:  Current Facility-Administered Medications Ordered in Epic   Medication Dose Route Frequency Provider Last Rate Last Admin    ketorolac (TORADOL) injection 30 mg  30 mg Intravenous Once Chris Noel PA        Lidocaine 4 % 1 patch  1 patch Transdermal Once Chris Noel PA        methocarbamol (ROBAXIN) tablet 750 mg  750 mg Oral Once Chris Noel PA        sodium chloride 0.9 % flush 10 mL  10 mL Intravenous PRN Chris Noel PA         Current Outpatient Medications Ordered in Epic   Medication Sig Dispense Refill    acetaminophen (TYLENOL) 500 MG tablet Take 2 tablets by mouth Every 6 (Six) Hours As Needed for Moderate Pain. 60 tablet 0    baclofen (LIORESAL) 10 MG tablet Take 1 tablet by mouth 3 (Three) Times a Day As Needed for Muscle Spasms. 20 tablet 0    lidocaine (LIDODERM) 5 % Place 1 patch on the skin as directed by provider Daily for 12 days. Remove & Discard patch within 12 hours or as directed by MD 12 patch 0    meloxicam (MOBIC) 15 MG tablet Take 1 tablet by mouth Daily for 30 days. As needed for moderate pain 30 tablet 0    predniSONE (DELTASONE) 50 MG tablet Take 1 tablet by mouth Daily. 7 tablet 0              PROGRESS, DATA ANALYSIS, CONSULTS, AND MEDICAL DECISION MAKING  All labs have been independently interpreted by me.  All radiology studies have been reviewed by me. All EKG's have been independently viewed and interpreted by me.  Discussion below represents my analysis of pertinent findings related to patient's condition, differential diagnosis, treatment plan and final disposition.      DIFFERENTIAL DIAGNOSIS INCLUDE BUT NOT LIMITED TO:     Thoracic strain,  thoracic vertebral fracture, bulging disc    Clinical Scores: N/A      ED Course as of 12/17/24 0951   e Dec 17, 2024   0946 I discussed the case with BRITTNY Hernandez with NS at this time regarding the patient.  I discussed work-up, results, concerns.  I discussed the consulting provider's desire for observation admission for neurosurgical consultation and further management.   [DC]   0947 I discussed the case with BRITTNY West with [facility] at this time regarding the patient.  I discussed work-up, results, concerns.  I discussed the consulting provider's desire for obs admit.    [DC]   0949 Patient presentation and evaluation consistent with intractable upper back pain requiring admission for neurosurgical consultation and further symptom control.  Patient agreeable with all questions answered. [DC]      ED Course User Index  [DC] Chris Noel PA          0951 I rechecked the patient.  I discussed the patient's labs, radiology findings (including all incidental findings), diagnosis, and plan for admission. The patient understands and agrees with the plan.      AS OF 09:51 EST VITALS:    BP -    HR - 88  TEMP - 97.6 °F (36.4 °C) (Tympanic)  O2 SATS - 98%    COMPLEXITY OF CARE  The patient requires admission.      DIAGNOSIS  Final diagnoses:   Chronic upper back pain   Intractable pain         DISPOSITION  ED Disposition       ED Disposition   Decision to Admit    Condition   --    Comment   --                Please note that portions of this document were completed with a voice recognition program.    Note Disclaimer: At [facility], we believe that sharing information builds trust and better relationships. You are receiving this note because you recently visited [facility]. It is possible you will see health information before a provider has talked with you about it. This kind of information can be easy to misunderstand. To help you fully understand what it means for your health, we urge you to discuss this note  with your provider.         Chris Noel PA  12/17/24 0981

## 2024-12-17 NOTE — PROGRESS NOTES
Clinical Pharmacy Services: Medication History    Mali Orlando is a 39 y.o. male presenting to [facility] for   Chief Complaint   Patient presents with    Back Pain       He  has no past medical history on file.    Allergies as of 12/17/2024    (No Known Allergies)       Medication information was obtained from: Patient   Pharmacy and Phone Number:     Prior to Admission Medications       Prescriptions Last Dose Informant Patient Reported? Taking?    acetaminophen (TYLENOL) 500 MG tablet  Pharmacy No Yes    Take 2 tablets by mouth Every 6 (Six) Hours As Needed for Moderate Pain.    baclofen (LIORESAL) 10 MG tablet   No Yes    Take 1 tablet by mouth 3 (Three) Times a Day As Needed for Muscle Spasms.    lidocaine (LIDODERM) 5 %  Pharmacy No Yes    Place 1 patch on the skin as directed by provider Daily for 12 days. Remove & Discard patch within 12 hours or as directed by MD    meloxicam (MOBIC) 15 MG tablet  Pharmacy No Yes    Take 1 tablet by mouth Daily for 30 days. As needed for moderate pain    predniSONE (DELTASONE) 50 MG tablet  Pharmacy No Yes    Take 1 tablet by mouth Daily.              Medication notes:     This medication list is complete to the best of my knowledge as of 12/17/2024    Please call if questions.    Marcel Zuniga  Medication History Technician  [phone]    12/17/2024 10:01 EST

## 2024-12-17 NOTE — ED PROVIDER NOTES
MD ATTESTATION NOTE    The BRITTNY and I have discussed this patient's history, physical exam, and treatment plan.  I have reviewed the documentation and personally had a face to face interaction with the patient. I affirm the documentation and agree with the treatment and plan.  The attached note describes my personal findings.        SHARED APC FACE TO FACE: I performed a substantive part of the MDM during the patient's E/M visit. I personally evaluated and examined the patient. I personally made or approved the documented management plan and acknowledge its risk of complications.        Brief HPI: Patient presents for evaluation of upper back pain.  Patient been seen here 4 times over the past 5 days.  Patient is scheduled to see neurosurgery.  Patient states pain persist.  Has had no focal weakness or numbness.  Had had CT scan.    PHYSICAL EXAM  ED Triage Vitals [12/17/24 0845]   Temp Heart Rate Resp BP SpO2   97.6 °F (36.4 °C) 88 16 -- 98 %      Temp src Heart Rate Source Patient Position BP Location FiO2 (%)   Tympanic Monitor -- -- --         GENERAL: no acute distress  HENT: nares patent  EYES: no scleral icterus  CV: regular rhythm, normal rate  RESPIRATORY: normal effort  ABDOMEN: soft  MUSCULOSKELETAL: no deformity  NEURO: alert, moves all extremities, follows commands  PSYCH:  calm, cooperative  SKIN: warm, dry    Vital signs and nursing notes reviewed.    ED Course as of 12/17/24 1000   Tue Dec 17, 2024   0946 I discussed the case with BRITTNY Hernandez with NS at this time regarding the patient.  I discussed work-up, results, concerns.  I discussed the consulting provider's desire for observation admission for neurosurgical consultation and further management.   [DC]   5147 I discussed the case with BRITTNY West with KAYLAH at this time regarding the patient.  I discussed work-up, results, concerns.  I discussed the consulting provider's desire for obs admit.    [DC]   0949 Patient presentation and evaluation  consistent with intractable upper back pain requiring admission for neurosurgical consultation and further symptom control.  Patient agreeable with all questions answered. [DC]      ED Course User Index  [DC] Chris Noel, PA         Plan: admit       Alvaro Kennedy MD  12/17/24 2167

## 2024-12-17 NOTE — NURSING NOTE
Pt states he Is refusing to take any medication unit the MRI results are gone over with him, MD is aware

## 2024-12-17 NOTE — PLAN OF CARE
Goal Outcome Evaluation:         Pt admitted to obs for chronic back pain. Neurosurgery consulted. NPO at midnight. Pt requires a Turkish  when discussing medical care. Pt is A&Ox4, in NAD, resting in bed at this time with stable vital signs. Mild potassium replacement protocol initiated.

## 2024-12-17 NOTE — H&P
[facility]   HISTORY AND PHYSICAL    Patient Name: Mali Orlando  : 1985  MRN: [id number]  Primary Care Physician:  Noman Akins APRN  Date of admission: 2024    Subjective   Subjective     Chief Complaint:   Chief Complaint   Patient presents with    Back Pain         HPI:    Mali Orlando is a 39 y.o. male with no significant past medical history other than chronic back pain since  who presents with acute on chronic back pain and has had 4 ED visits since 2024 for upper back pain without radiation associated with numbness from bellybutton down.  Patient denies any known injury associated with the  onset of the back pain and specifically denies any new workouts, recent exacerbating bending/pulling/twisting motions that would cause the worsening acuity.  Patient reports that he does tend to have worsening back pain in the castillo.  Patient reports that his pain is generally worse in the mornings.  Patient also reports that the numbness from his bellybutton down is intermittent episodes but lasts usually for a minimum of 20 days the 2 previous times that he has had it.  Patient denies any fevers or chills or urinary/bowel incontinence.    [id number]  used #[id number]    Review of Systems   All systems were reviewed and negative except for: All pertinent findings listed in the above HPI.    Personal History     History reviewed. No pertinent past medical history.    History reviewed. No pertinent surgical history.    Family History: family history is not on file. Otherwise pertinent FHx was reviewed and not pertinent to current issue.    Social History:  reports that he has never smoked. He has never been exposed to tobacco smoke. He has never used smokeless tobacco. He reports that he does not drink alcohol and does not use drugs.    Home Medications:  acetaminophen, baclofen, lidocaine, meloxicam, and predniSONE    Allergies:  No Known Allergies    Objective   Objective     Vitals:    Temp:  [97.5 °F (36.4 °C)-97.6 °F (36.4 °C)] 97.5 °F (36.4 °C)  Heart Rate:  [66-88] 67  Resp:  [16] 16  BP: (116-127)/(90-92) 127/91  Physical Exam    Constitutional: Awake, alert   Eyes: PERRL, sclerae anicteric, no conjunctival injection, EOMI   HENT: NCAT, mucous membranes moist, normal hearing   Neck: Supple, nontender, trachea midline   Respiratory: Clear to auscultation bilaterally, nonlabored respirations on room air   Cardiovascular: RRR, no murmurs, palpable pedal pulses bilaterally   Gastrointestinal: Positive bowel sounds, soft, nontender, nondistended   Musculoskeletal: No bilateral ankle edema, no clubbing or cyanosis to extremities   Psychiatric: Appropriate affect, cooperative   Neurologic: Oriented x 3, strength symmetric in all extremities, Cranial Nerves grossly intact to confrontation, speech clear   Skin: No rashes     Result Review    Result Review:  I have personally reviewed the results from the time of this admission to 12/17/2024 15:31 EST and agree with these findings:  [x]  Laboratory list / accordion  []  Microbiology  [x]  Radiology  []  EKG/Telemetry   []  Cardiology/Vascular   []  Pathology  []  Old records  []  Other:  Most notable findings include: Potassium 3.3, serum creatinine 0.73, AST within normal limits, ALT mildly elevated at 49, total bili 0.4, lipase 31, normal WBC, hemoglobin 14.6.    CT thoracic spine without on 12/14/2024 reviewed and negative for any acute issues      Assessment & Plan   Assessment / Plan     Brief Patient Summary:  Mali Orlando is a 39 y.o. male who is admitted for acute on chronic back pain    Active Hospital Problems:  Active Hospital Problems    Diagnosis     **Chronic midline thoracic back pain      Plan:     Acute on chronic back pain:  -Pending MRI cervical and thoracic spine without  -Neurosurgery following  -Steroids ordered  -Multimodal pain regimen  -PT consulted    Hypokalemia:  -Mild, replacement protocol ordered  -Recheck a.m.  labs        VTE Prophylaxis:  Mechanical VTE prophylaxis orders are present.        CODE STATUS:    Level Of Support Discussed With: Patient  Code Status (Patient has no pulse and is not breathing): CPR (Attempt to Resuscitate)  Medical Interventions (Patient has pulse or is breathing): Full Support    Admission Status:  I believe this patient meets observation status.    Electronically signed by Jenna Carreon PA-C, 12/17/24, 3:31 PM EST.        75 minutes has been spent by [facility] providers in the care of this patient while under observation status      I have worn appropriate PPE during this patient encounter, sanitized my hands both with entering and exiting patient's room.

## 2024-12-17 NOTE — CONSULTS
[facility] NEUROSURGERY CONSULT NOTE    Patient name: Mali Orlando  Referring Provider: No ref. provider found  Reason for Consultation: R leg weakness, back pain.    Patient Care Team:  Noman Akins APRN as PCP - General (Nurse Practitioner)    Chief complaint: Recurrent mid to upper thoracic pain episodes with subsequent right greater than left lower extremity numbness and weakness.     Subjective .     History of present illness:    Patient is a 39 y.o. [place] right handed male who has been in the [place] for about 10 years.  He was seen previously at [facility] 9/22/2019 for complaints of right sided thoracic pain without numbness or weakness in the lower extremities.  He did state that on 2 episodes prior to that he had experienced paralysis in the lower extremities while he was still living in [facility].  He also recalled the same instance with this examiner stating that he was hospitalized for several weeks and full imaging workup with no cause determined.  From the ER note in 2019 CT thoracic spine that showed no acute abnormality and the patient was discharged.  There was a another emergency room visit at [facility] in November 2020 where the patient had the same complaints and also complained of thoracic spine pain with any movement.  X-rays were negative for acute abnormality.  He was discharged with muscle relaxers, ibuprofen and Medrol Dosepak.  He was seen again in the emergency department November 30, 2020 (3 days later) with the same above complaints.  He was seen at [facility] 12/1/2020.  The office note indicated that he had had this pain for approximately 11 years as of that date.  He was referred for outpatient physical therapy, and referred for evaluation by physical medicine and rehab.  He presented again, this time to [facility] emergency department 12/25/2023 with the same complaints of intense spasm-like pain in the mid to upper thoracic region  "and a sense of feeling paralyzed, unable to move his lower extremities; again he denied bowel or bladder incontinence or radiating leg pain.  CT imaging of the abdomen and pelvis without contrast, 12/25/2023 revealed a distended stomach with fluid and debris as well as an 8 mm low-attenuation lesion within the right lobe of the liver which was indeterminant.CT lumbar spine without contrast revealed mild degenerative changes, no acute abnormality. The patient was subsequently discharged.      The patient presented to [facility] emergency department 12/13/2024, 12/14/2024, 12/15/2024, and again today 12/17/2024 with the same above complaints.  CT imaging of the thoracic spine showed no acute abnormality.  Neurosurgery has been asked to evaluate give further recommendations.      Review of Systems  Review of Systems   Constitutional:  Positive for activity change. Negative for appetite change, chills, fatigue and fever.   HENT: Negative.     Eyes: Negative.    Respiratory:  Negative for cough, chest tightness and shortness of breath.         Reports episodes of \"loss of ability to breathe\" with associated mid to upper thoracic pain episodes.   Cardiovascular: Negative.  Negative for chest pain and leg swelling.   Gastrointestinal: Negative.  Negative for nausea and vomiting.        Denies any bowel incontinence.   Endocrine: Negative.    Genitourinary: Negative.  Negative for difficulty urinating, dysuria and frequency.        Denies any bladder incontinence.   Musculoskeletal:  Positive for back pain and gait problem.        Reports multiple episodes of severe upper to mid thoracic pain that takes his breath away with associated heaviness, numbness and both legs from the waist down right greater than left.   Skin: Negative.    Allergic/Immunologic: Negative.    Neurological:  Positive for weakness and numbness. Negative for dizziness, seizures and headaches.        The episodes of numbness and right " greater than left leg weakness occurs sporadically along with the episodes of severe mid to upper thoracic back pain.   Psychiatric/Behavioral: Negative.       History  PAST MEDICAL HISTORY  History reviewed. No pertinent past medical history.    PAST SURGICAL HISTORY  History reviewed. No pertinent surgical history.    FAMILY HISTORY  History reviewed. No pertinent family history.    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  He is .  He lives with his wife.   Social History     Tobacco Use    Smoking status: Never     Passive exposure: Never    Smokeless tobacco: Never   Vaping Use    Vaping status: Never Used   Substance Use Topics    Alcohol use: Never    Drug use: Never       Allergies:  Patient has no known allergies.    MEDICATIONS:  Medications Prior to Admission   Medication Sig Dispense Refill Last Dose/Taking    acetaminophen (TYLENOL) 500 MG tablet Take 2 tablets by mouth Every 6 (Six) Hours As Needed for Moderate Pain. 60 tablet 0 12/17/2024 Morning    baclofen (LIORESAL) 10 MG tablet Take 1 tablet by mouth 3 (Three) Times a Day As Needed for Muscle Spasms. 20 tablet 0 Taking As Needed    lidocaine (LIDODERM) 5 % Place 1 patch on the skin as directed by provider Daily for 12 days. Remove & Discard patch within 12 hours or as directed by MD 12 patch 0 12/16/2024 Morning    meloxicam (MOBIC) 15 MG tablet Take 1 tablet by mouth Daily for 30 days. As needed for moderate pain 30 tablet 0 Taking    predniSONE (DELTASONE) 50 MG tablet Take 1 tablet by mouth Daily. 7 tablet 0 12/16/2024 Morning         Current Facility-Administered Medications:     acetaminophen (TYLENOL) tablet 650 mg, 650 mg, Oral, Q4H PRN **OR** acetaminophen (TYLENOL) 160 MG/5ML oral solution 650 mg, 650 mg, Oral, Q4H PRN **OR** acetaminophen (TYLENOL) suppository 650 mg, 650 mg, Rectal, Q4H PRN, Rakesh Reddy MD    sennosides-docusate (PERICOLACE) 8.6-50 MG per tablet 2 tablet, 2 tablet, Oral, BID PRN  **AND** polyethylene glycol (MIRALAX) packet 17 g, 17 g, Oral, Daily PRN **AND** bisacodyl (DULCOLAX) EC tablet 5 mg, 5 mg, Oral, Daily PRN **AND** bisacodyl (DULCOLAX) suppository 10 mg, 10 mg, Rectal, Daily PRN, Rakesh Reddy MD    Calcium Replacement - Follow Nurse / BPA Driven Protocol, , Not Applicable, PRN, Rakesh Reddy MD    HYDROcodone-acetaminophen (NORCO) 5-325 MG per tablet 1 tablet, 1 tablet, Oral, Q6H PRN, Rakesh Reddy MD    HYDROmorphone (DILAUDID) injection 0.5 mg, 0.5 mg, Intravenous, Q2H PRN **AND** naloxone (NARCAN) injection 0.4 mg, 0.4 mg, Intravenous, Q5 Min PRN, Rakesh Reddy MD    ketorolac (TORADOL) injection 30 mg, 30 mg, Intravenous, Once, Chris Noel PA    Lidocaine 4 % 1 patch, 1 patch, Transdermal, Once, Chris Noel PA    Lidocaine 4 % 1 patch, 1 patch, Transdermal, Q24H, Rakesh Reddy MD    Magnesium Standard Dose Replacement - Follow Nurse / BPA Driven Protocol, , Not Applicable, Maureen ZIEGLER Justin T, MD    methocarbamol (ROBAXIN) tablet 500 mg, 500 mg, Oral, Q8H PRN, Rakesh Reddy MD    methocarbamol (ROBAXIN) tablet 750 mg, 750 mg, Oral, Once, Chris Noel PA    Phosphorus Replacement - Follow Nurse / BPA Driven Protocol, , Not Applicable, Maureen ZIEGLER Justin T, MD    Potassium Replacement - Follow Nurse / BPA Driven Protocol, , Not Applicable, Maureen ZIEGLER Justin T, MD    [COMPLETED] Insert Peripheral IV, , , Once **AND** sodium chloride 0.9 % flush 10 mL, 10 mL, Intravenous, PRN, Chris Noel PA    sodium chloride 0.9 % flush 10 mL, 10 mL, Intravenous, Q12H, Rakesh Reddy MD    sodium chloride 0.9 % flush 10 mL, 10 mL, Intravenous, Maureen ZIEGLER Justin T, MD    sodium chloride 0.9 % infusion 40 mL, 40 mL, Intravenous, Maureen ZIEGLER Justin T, MD      Objective     Results Review:  LABS:  Results from last 7 days   Lab Units 12/17/24  1023   WBC 10*3/mm3 7.34   HEMOGLOBIN g/dL 14.6   HEMATOCRIT % 42.6   PLATELETS 10*3/mm3  212     DIAGNOSTICS:    CT THORACIC SPINE WO CONTRAST- 12/14/2024     COMPARISON: None available     FINDINGS:     No acute fracture or malalignment is identified. No prominent osseous narrowing of the neural foramina. No high-grade central stenosis is identified. Mild dependent atelectasis is present in both lungs.     Results Review:   I reviewed the patient's new clinical results.  I personally viewed and interpreted the patient's CT imaging thoracic spine and discussed these results with Dr. Prasad.    Vital Signs   Temp:  [97.5 °F (36.4 °C)-97.6 °F (36.4 °C)] 97.5 °F (36.4 °C)  Heart Rate:  [66-88] 67  Resp:  [16] 16  BP: (116-127)/(90-92) 127/91    Physical Exam:  Physical Exam  Vitals reviewed.   Constitutional:       General: He is not in acute distress.     Appearance: Normal appearance. He is well-developed and normal weight. He is not ill-appearing, toxic-appearing or diaphoretic.   HENT:      Head: Normocephalic and atraumatic.      Nose: Nose normal.   Eyes:      General:         Right eye: No discharge.         Left eye: No discharge.      Extraocular Movements: Extraocular movements intact.      Conjunctiva/sclera: Conjunctivae normal.      Pupils: Pupils are equal, round, and reactive to light.   Neck:      Trachea: No tracheal deviation.   Cardiovascular:      Rate and Rhythm: Normal rate.   Pulmonary:      Effort: Pulmonary effort is normal. No respiratory distress.   Abdominal:      General: There is no distension.      Palpations: Abdomen is soft.      Tenderness: There is no abdominal tenderness.   Musculoskeletal:         General: Tenderness present. No swelling, deformity or signs of injury. Normal range of motion.      Cervical back: Normal range of motion and neck supple. No tenderness.      Right lower leg: No edema.      Left lower leg: No edema.      Comments: Tenderness noted to palpation of the mid thoracic region.   Skin:     General: Skin is warm and dry.      Findings: No erythema.    Neurological:      Mental Status: He is alert and oriented to person, place, and time.      GCS: GCS eye subscore is 4. GCS verbal subscore is 5. GCS motor subscore is 6.      Sensory: No sensory deficit.      Motor: No weakness or abnormal muscle tone.      Coordination: Coordination normal.      Deep Tendon Reflexes: Reflexes are normal and symmetric. Reflexes normal.      Comments: AA&O x 3.  The patient speaks with an accent.  But speech is intelligible. Converses appropriately.  PERRL. EOM's intact. Face symmetric. Tongue midline without fasiculations or atrophy. Sensation equal and intact throughout the face.  Hearing is intact bilaterally to finger rustle.  Negative pronator drift.  No dysmetria.  No motor or sensory deficits. DTR's normal. Negative Klein's; negative clonus. SLR and Damir's negative bilaterally.  Gait not tested due to fall risk.   Psychiatric:         Behavior: Behavior is cooperative.         Thought Content: Thought content normal.       Neurological Exam  Mental Status  Alert. Oriented to person, place, and time.    Cranial Nerves  CN III, IV, VI: Extraocular movements intact bilaterally. Pupils equal round and reactive to light bilaterally.    Reflexes  Deep tendon reflexes are 2+ and symmetric in all four extremities.      Assessment & Plan       Chronic midline thoracic back pain      Problem List Items Addressed This Visit    None  Visit Diagnoses       Chronic upper back pain    -  Primary    Intractable pain               COMORBID CONDITIONS:  No Comorbidities    PLAN:     I have discussed the above history of present illness, radiographic findings, previous imaging findings from prior emergency room visits and physical exam findings with Dr. Prasad.      We will start by getting MRI imaging of the cervical and thoracic spine.      I extensively reviewed the patient's chart.  He has had multiple ER visits both here (within the last year) and [facility] (dating back to 2019) for  "the same complaints.      CT scan of the abdomen and pelvis performed 1 year ago revealed evidence of a liver lesion measuring 8 mm as well as stomach distention with fluid and debris.  Further imaging had been recommended at that time.      It is uncertain whether any of his current symptoms are related to his abdomen and pelvis but we will certainly leave any further needed workup to the emergency room and/or admitting service if deemed medically necessary.    I discussed the patient's findings and my recommendations with patient and consulting provider    During patient visit, I utilized appropriate personal protective equipment including gloves and mask.  Mask used was standard procedure mask. Appropriate PPE was worn during the entire visit.  Hand hygiene was completed before and after.     Mary Juarez, APRN  12/17/24  13:50 EST    \"Dictated utilizing Dragon dictation\".    "

## 2024-12-18 VITALS
HEART RATE: 77 BPM | SYSTOLIC BLOOD PRESSURE: 119 MMHG | RESPIRATION RATE: 16 BRPM | TEMPERATURE: 97.4 F | OXYGEN SATURATION: 96 % | DIASTOLIC BLOOD PRESSURE: 86 MMHG

## 2024-12-18 PROBLEM — R93.7 ABNORMAL MRI, THORACIC SPINE: Status: ACTIVE | Noted: 2024-12-18

## 2024-12-18 LAB
ALBUMIN SERPL-MCNC: 4.1 G/DL (ref 3.5–5.2)
ALBUMIN/GLOB SERPL: 1.6 G/DL
ALP SERPL-CCNC: 66 U/L (ref 39–117)
ALT SERPL W P-5'-P-CCNC: 46 U/L (ref 1–41)
ANION GAP SERPL CALCULATED.3IONS-SCNC: 11.6 MMOL/L (ref 5–15)
AST SERPL-CCNC: 26 U/L (ref 1–40)
BILIRUB SERPL-MCNC: 0.3 MG/DL (ref 0–1.2)
BUN SERPL-MCNC: 13 MG/DL (ref 6–20)
BUN/CREAT SERPL: 15.1 (ref 7–25)
CALCIUM SPEC-SCNC: 8.6 MG/DL (ref 8.6–10.5)
CHLORIDE SERPL-SCNC: 104 MMOL/L (ref 98–107)
CO2 SERPL-SCNC: 23.4 MMOL/L (ref 22–29)
CREAT SERPL-MCNC: 0.86 MG/DL (ref 0.76–1.27)
DEPRECATED RDW RBC AUTO: 45 FL (ref 37–54)
EGFRCR SERPLBLD CKD-EPI 2021: 112.3 ML/MIN/1.73
ERYTHROCYTE [DISTWIDTH] IN BLOOD BY AUTOMATED COUNT: 14 % (ref 12.3–15.4)
GLOBULIN UR ELPH-MCNC: 2.6 GM/DL
GLUCOSE SERPL-MCNC: 91 MG/DL (ref 65–99)
HCT VFR BLD AUTO: 44.9 % (ref 37.5–51)
HGB BLD-MCNC: 15.1 G/DL (ref 13–17.7)
MCH RBC QN AUTO: 29.4 PG (ref 26.6–33)
MCHC RBC AUTO-ENTMCNC: 33.6 G/DL (ref 31.5–35.7)
MCV RBC AUTO: 87.5 FL (ref 79–97)
PLATELET # BLD AUTO: 218 10*3/MM3 (ref 140–450)
PMV BLD AUTO: 11 FL (ref 6–12)
POTASSIUM SERPL-SCNC: 4.1 MMOL/L (ref 3.5–5.2)
PROT SERPL-MCNC: 6.7 G/DL (ref 6–8.5)
RBC # BLD AUTO: 5.13 10*6/MM3 (ref 4.14–5.8)
SODIUM SERPL-SCNC: 139 MMOL/L (ref 136–145)
WBC NRBC COR # BLD AUTO: 7.95 10*3/MM3 (ref 3.4–10.8)

## 2024-12-18 PROCEDURE — G0378 HOSPITAL OBSERVATION PER HR: HCPCS

## 2024-12-18 PROCEDURE — 85027 COMPLETE CBC AUTOMATED: CPT | Performed by: PHYSICIAN ASSISTANT

## 2024-12-18 PROCEDURE — 80053 COMPREHEN METABOLIC PANEL: CPT | Performed by: PHYSICIAN ASSISTANT

## 2024-12-18 PROCEDURE — 99232 SBSQ HOSP IP/OBS MODERATE 35: CPT | Performed by: NURSE PRACTITIONER

## 2024-12-18 NOTE — PROGRESS NOTES
Patient Care Team:  Noman Akins APRN as PCP - General (Nurse Practitioner)     KAYLAH HINTON H&P Note    I supervised care provided by the midlevel provider. We have discussed this patient's history, physical exam, and treatment plan. I have reviewed the midlevel provider's note and I agree with the midlevel provider's findings and plan of care.   SHARED VISIT: This visit was performed by BOTH a physician and an APC. The substantive portion of the medical decision making was performed by this attesting physician who made or approved the management plan and takes responsibility for patient management.   I have personally had a face to face encounter with the patient.   My personal findings are documented below:    History:  39-year-old male with chronic back pain presents with worsening back pain as well as numbness from umbilicus down and weakness of the right lower extremity.  No cauda equina symptoms.    Physical Exam:  General: No acute distress.  HENT: NCAT, PERRL, Nares patent.  Eyes: no scleral icterus.  Neck: trachea midline, no ROM limitations.  CV: regular rhythm, regular rate.  Respiratory: normal effort, CTAB.  Abdomen: soft, nondistended, NTTP, no rebound tenderness, no guarding or rigidity.  Thoracic/lumbar spine: No step-offs or deformities, no midline tenderness palpation, bilateral paraspinal tenderness to palpation.  5 out of 5 strength in bilateral lower extremities with sensation intact to light touch.  1+ patellar and Achilles reflexes bilaterally, negative clonus bilaterally, negative Babinski bilaterally.  Musculoskeletal: no deformity.  Neuro: alert, moves all extremities, follows commands.  Skin: warm, dry.    Assessment and Plan:  Patient admitted to the observation unit, neurosurgery consulted, obtaining MRIs imaging of the cervical and thoracic spine, steroids, multimodal pain control, PT consulted.

## 2024-12-18 NOTE — DISCHARGE INSTRUCTIONS
Return to the emergency department for symptoms recurrence or exacerbation  Follow-up with neurosurgery as scheduled on 1/6/2025     normal...

## 2024-12-18 NOTE — PROGRESS NOTES
MD ATTESTATION NOTE - observation progress    The BRITTNY and I have discussed this patient's history, physical exam, and treatment plan.  I have reviewed the documentation and personally had a face to face interaction with the patient. I affirm the documentation and agree with the treatment and plan.  The attached note describes my personal findings.        SHARED APC FACE TO FACE: I performed a substantive part of the MDM during the patient's E/M visit. I personally evaluated and examined the patient. I personally made or approved the documented management plan and acknowledge its risk of complications.        Brief HPI: Patient admitted the observation unit for chronic back pain.  MRI shows thoracic lesion.  Patient has been seen by neurosurgery.  Pain has been controlled and really not requiring much.  Has slept comfortably            GENERAL: no acute distress  HENT: nares patent  EYES: no scleral icterus  CV: regular rhythm, normal rate  RESPIRATORY: normal effort  ABDOMEN: soft  MUSCULOSKELETAL: no deformity  NEURO: alert, moves all extremities, follows commands  PSYCH:  calm, cooperative  SKIN: warm, dry    Vital signs and nursing notes reviewed.        Plan: MRI with contrast complete.  Neurosurgery following

## 2024-12-18 NOTE — PROGRESS NOTES
LOS: 0 days   Patient Care Team:  Noman Akins APRN as PCP - General (Nurse Practitioner)    Chief Complaint: Thoracic pain; abnormal thoracic MRI      Subjective     Today's visit was performed with an available [place]  [id number] and again with [id number].    Interval History: MRI thoracic spine performed yesterday without contrast revealed abnormality in the thoracic posterior epidural soft tissues. The patient has grown tired of waiting for further determination of the non-specific mass noted in the thoracic MRI images. He denies any recurrent pain since presentation yesterday. Legs feel normal; no numbness or weakness. He is walking around the observation unit unassisted without difficulty.     History taken from: patient chart    Objective        Vital Signs  Temp:  [97.4 °F (36.3 °C)-98.1 °F (36.7 °C)] 97.4 °F (36.3 °C)  Heart Rate:  [66-78] 77  Resp:  [15-16] 16  BP: (106-119)/(75-86) 119/86    Physical Exam:     AA&O x 3.  No motor or sensory deficits.   DTR's normal throughout. Negative Klein's and negative clonus bilaterally.      Results Review:     I reviewed the patient's new clinical results.  I reviewed the patient's new imaging results and agree with the interpretation.  Discussed with patient and Dr. Prasad    MRI T SPINE W WO Contrast 12/18/2024     COMPARISON:    MRI thoracic spine without contrast 12 17 24     FINDINGS:    Vertebrae:  Thoracic alignment is anatomic without acute fracture. Relatively diffuse T1 marrow hypointensity in the osseous structures with faint marrow enhancement seen on postcontrast imaging.  Re-  demonstration of the T10 vertebral body hemangioma.      Discs spinal canal neural foramina:  The dorsal epidural T1 T2 heterogeneous mass extending from T2-T5 enhances and extends into the right greater than left T3-T4 and T4-T5 neural foramina compatible with a neoplastic process. The enhancing lesion measures up to 6 mm AP.  Mild thecal sac effacement at T3 and  T4 related to the dorsal epidural mass. No significant thoracic degenerative disc disease.  No high-grade thecal sac effacement    Spinal cord:  Unremarkable.  No abnormal enhancement.  No cord compression or cord signal abnormality.    Soft tissues:  Unremarkable.     IMPRESSION:         Enhancing dorsal epidural mass from T2-T5 extending into the right   greater than left T3-T4 and T4-T5 neural foramina.  Additionally, relatively diffuse T1 osseous marrow hypointensity is present with faint marrow enhancement.  In total, findings are most compatible with   lymphoproliferative disorder.       MRI THORACIC SPINE WO CONTRAST 12/17/2024    There is a partially visualized area of abnormal signal within the posterior epidural soft tissues extending from T2-T5 (5.9 cm in craniocaudal dimension, 4.5 mm in AP dimension and 12 to 13 mm in maximum transverse dimension).  Appearance is heterogeneous with decreased signal intensity on the T1 sequence. On the T2 sequence there is central signal loss with peripheral T2 hyperintensity. The appearance is nonspecific but may represent an epidural fluid collection or  hematoma. An underlying vascular lesion or mass cannot be entirely excluded.  Further imaging with MRI thoracic spine with and without contrast is recommended. Ultimately spinal arteriogram may be required.      MRI CERVICAL SPINE WO CONTRAST 12/17/2024      The alignment of the cervical spine is within normal limits. There is mild disc desiccation at C5-6 with relative preservation of disc height. Signal intensity within the cord is normal on the sagittal and axial T2 sequences.   There is no evidence of cord signal abnormality or of a cervical disc herniation. Mild degenerative disease involving the cervical spine noted and appears most prominent at C5-6 where a small left paracentral disc osteophyte complex mildly abuts the ventral surface of the cord.     Assessment & Plan       Chronic midline thoracic back pain     Abnormal MRI, thoracic spine    I reviewed the cervical and thoracic MRI images without contrast yesterday late afternoon with Dr. Alonzo and also reviewed the new MRI of the thoracic spine performed late last evening with and without contrast with Dr. Alonzo this morning as well.    The MRI with and without contrast again shows an abnormality within the epidural soft tissues from T2-T5.  The appearance is nonspecific however this mass enhances and appears to be more greatly enhanced on the right side at the levels of T3-4, T4-5.  There is no evidence of cord compression or abnormal cord signal.  No evidence of enhancement of the thecal sac.  There are diffuse marrow hypointensity on the T1 images as well as faint marrow enhancement which is suspicious for possible lymphoproliferative disorder.    I explained this to the patient with the use of the .  I also explained that further workup will need to be considered.  He did have a noncontrasted CT abdomen pelvis [facility] 1 year ago that revealed an indeterminant 8 mm low  attenuation lesion within the right lobe of the liver.  It might be wise to consider CT of the chest abdomen and pelvis with and without contrast.  The patient was not interested in pursuing this at this time.  He adamantly refused to remain at the hospital any longer for any further workup whatsoever.    The patient is willing to return to the office for a visit with Dr. Prasad but prefers to wait until after the first of the year when he has more available vacation days at his work.  I discussed this with Dr. Prasad and have arranged an appointment for January 6 at 8:30 AM with Dr. Prasad.    Mary Juarez, APRN  12/18/24  13:39 EST

## 2024-12-18 NOTE — DISCHARGE SUMMARY
ED OBSERVATION PROGRESS/DISCHARGE SUMMARY    Date of Admission: 12/17/2024   LOS: 0 days   PCP: Noman Akins APRN      Subjective     Hospital Outcome:   Mali Orlando is a 39 y.o. male with no significant past medical history other than chronic back pain since 2008 who presents with acute on chronic back pain and has had 4 ED visits since 12/13/2024 for upper back pain without radiation associated with numbness from bellybutton down.  Patient denies any known injury associated with the 2008 onset of the back pain and specifically denies any new workouts, recent exacerbating bending/pulling/twisting motions that would cause the worsening acuity.  Patient reports that he does tend to have worsening back pain in the castillo.  Patient reports that his pain is generally worse in the mornings.  Patient also reports that the numbness from his bellybutton down is intermittent episodes but lasts usually for a minimum of 20 days the 2 previous times that he has had it.  Patient denies any fevers or chills or urinary/bowel incontinence.     12/18: Patient states his back pain has resolved at this time denies any numbness or tingling currently.  I had a long conversation with the patient with iPad  phone explaining the indication for steroids however patient is declining this citing that his symptoms have resolved.  MRI spine shows enhancing dorsal epidural mass from T2-T5 extending into the right greater than left T3-T4 and T4-T5 neuroforamina. Additionally,   relatively diffuse T1 osseous marrow hypointensity is present with faint   marrow enhancement.  In total, findings are most compatible with   lymphoproliferative disorder.  Neurosurgery evaluated patient this afternoon and has requested further imaging however patient stated that he is unable to stay any longer due to work obligation therefore he will follow-up with neurosurgery outpatient on January 6 8:30 AM.  Explained in details the cons of leaving  without finishing up the workup by neurosurgery however patient voiced understanding of the risks and does not wish to stay for the further imaging.      ROS:  General: no fevers, chills  Respiratory: no cough, dyspnea  Cardiovascular: no chest pain, palpitations  Abdomen: No abdominal pain, nausea, vomiting, or diarrhea  Neurologic: No focal weakness    Objective   Physical Exam:  I have reviewed the vital signs.  Temp:  [97.5 °F (36.4 °C)-98.1 °F (36.7 °C)] 98 °F (36.7 °C)  Heart Rate:  [66-88] 66  Resp:  [16] 16  BP: (106-127)/(75-92) 106/75  General Appearance:    Alert, cooperative, no distress  Head:    Normocephalic, atraumatic  Eyes:    Sclerae anicteric  Neck:   Supple, no mass  Lungs: Clear to auscultation bilaterally, respirations unlabored  Heart: Regular rate and rhythm, S1 and S2 normal, no murmur, rub or gallop  Abdomen:  Soft, nontender, bowel sounds active, nondistended  Extremities: No clubbing, cyanosis, or edema to lower extremities  Pulses:  2+ and symmetric in distal lower extremities  Skin: No rashes   Neurologic: Oriented x3, Normal strength to extremities    Results Review:    I have reviewed the labs, radiology results and diagnostic studies.    Results from last 7 days   Lab Units 12/18/24  0330   WBC 10*3/mm3 7.95   HEMOGLOBIN g/dL 15.1   HEMATOCRIT % 44.9   PLATELETS 10*3/mm3 218     Results from last 7 days   Lab Units 12/17/24  1023   SODIUM mmol/L 140   POTASSIUM mmol/L 3.3*   CHLORIDE mmol/L 107   CO2 mmol/L 24.0   BUN mg/dL 12   CREATININE mg/dL 0.73*   CALCIUM mg/dL 8.5*   BILIRUBIN mg/dL 0.4   ALK PHOS U/L 59   ALT (SGPT) U/L 49*   AST (SGOT) U/L 25   GLUCOSE mg/dL 97     Imaging Results (Last 24 Hours)       Procedure Component Value Units Date/Time    MRI Thoracic Spine With & Without Contrast [[id number]] Collected: 12/18/24 0111     Updated: 12/18/24 0125    Addenda:        ADDENDUM:  12 18 24 01:24 Call Doctor Regarding Other, called ANDREA Edwards  on 12 18 01:  24 (-05:00)       Signed: 12/18/24 0110 by Jim Gaviria MD    Narrative:      CR  Patient: MARTITA BOUCHER  Time Out: 01:10  Exam(s): MRI T SPINE W WO Contrast     EXAM:    MR Thoracic Spine Without and With Intravenous Contrast    CLINICAL HISTORY:     Reason for exam: Thoracic epidural abnormality seen on prior   noncontrast MRI, must be done w wo at the same time to assess epidural   abnormality upper thoracic spine.    TECHNIQUE:    Magnetic resonance images of the thoracic spine without and with   intravenous contrast in multiple planes.    COMPARISON:    MRI thoracic spine without contrast 12 17 24    FINDINGS:    Vertebrae:  Thoracic alignment is anatomic without acute fracture.    Relatively diffuse T1 marrow hypointensity in the osseous structures with   faint marrow enhancement seen on postcontrast imaging.  Redemonstration   of the T10 vertebral body hemangioma.      Discs spinal canal neural foramina:  The dorsal epidural T1 T2   heterogeneous mass extending from T2-T5 enhances and extends into the   right greater than left T3-T4 and T4-T5 neural foramina compatible with a   neoplastic process.  The enhancing lesion measures up to 6 mm AP.  Mild   thecal sac effacement at T3 and T4 related to the dorsal epidural mass.   No significant thoracic degenerative disc disease.  No high-grade thecal   sac effacement    Spinal cord:  Unremarkable.  No abnormal enhancement.  No cord   compression or cord signal abnormality.    Soft tissues:  Unremarkable.    IMPRESSION:         Enhancing dorsal epidural mass from T2-T5 extending into the right   greater than left T3-T4 and T4-T5 neural foramina.  Additionally,   relatively diffuse T1 osseous marrow hypointensity is present with faint   marrow enhancement.  In total, findings are most compatible with   lymphoproliferative disorder.      Impression:            Communications:     Call Doctor Other    Electronically signed by Jim Gaviria M.D. on 12-18-24 at 0110    MRI Thoracic Spine  Without Contrast [[id number]] Collected: 12/17/24 1632     Updated: 12/17/24 1633    Narrative:      MRI CERVICAL SPINE WO CONTRAST-, MRI THORACIC SPINE WO CONTRAST-     HISTORY:  R>L leg numbness, weakness; M54.9-Dorsalgia, unspecified;  G89.29-Other chronic pain; R52-Pain, unspecified     COMPARISON: CT of the thoracic spine and lumbar spine without contrast     MRI EXAMINATION OF THE CERVICAL SPINE WITHOUT CONTRAST:     FINDINGS: The alignment of the cervical spine is within normal limits.  There is mild disc desiccation at C5-6 with relative preservation of  disc height. Signal intensity within the cord is normal on the sagittal  and axial T2 sequences.     C2-3: There is no evidence of a disc bulge or herniation.     C3-4: There is no evidence of a disc bulge or herniation.     C4-5: There is no evidence of a disc bulge or herniation. Mild facet  degenerative disease is present on the left.     C5-6: A small left paracentral disc osteophyte complex is present which  minimally abuts the ventral surface of the cord. There is no evidence of  cord compression.     C6-7: There is no evidence of a disc bulge or herniation.     C7-T1: There is no evidence of a disc bulge or herniation.     The sagittal T2 sequence demonstrates abnormal signal appreciated  involving the epidural space posteriorly extending to the junction of  the middle and inferior thirds of T2 to the superior aspect of T5. This  measures approximately 5.9 cm in craniocaudal dimension. It measures  approximately 4.5 mm in AP dimension at the level of T3/T4.       Impression:      1.  There is no evidence of cord signal abnormality or of a cervical  disc herniation. Mild degenerative disease involving the cervical spine  is noted as described above most prominent of which is at C5-6 where a  small left paracentral disc osteophyte complex mildly abuts the ventral  surface of the cord. See above.  2.  There is an area of abnormal signal appreciated  involving the  posterior epidural soft tissues extending from T2-T5 (5.9 cm in  craniocaudal dimension and 4.5 mm in maximum AP dimension). This is only  partially visualized. It is heterogeneous in appearance being decreased  in signal intensity on the T1 sequence. On the T2 sequence there is  central signal loss with peripheral T2 hyperintensity. The appearance is  nonspecific. This may represent an epidural fluid collection or  hematoma. An underlying vascular lesion or mass cannot be entirely  excluded. Further evaluation with MRI examination of the thoracic spine  with and without contrast is recommended.        MRI EXAMINATION OF THE THORACIC SPINE WITHOUT CONTRAST:     FINDINGS: The alignment of the thoracic spine is within normal limits. A  moderate size hemangioma is appreciated involving the T10 vertebral body  measuring 1.5 cm in size. Signal intensity within the thoracic cord is  normal on the sagittal and axial T2 sequences. There is no evidence of  disc herniation or of cord compression.     Again identified is abnormal signal in the epidural soft tissues  posterior to the thecal sac extending from the junction of the middle  and inferior thirds of T2 to the superior aspect of T5. This measures  approximately 5.9 cm in craniocaudal dimension and 4.5 mm in maximum AP  dimension. It measures approximately 12 to 13 mm in the maximum  transverse dimension. Cerebrospinal fluid is not effaced ventral and  dorsal to the cord.     IMPRESSION:   1. Again identified is abnormal signal in the epidural soft tissues  posteriorly at the midline extending from T2-T5 measuring approximately  5.9 cm in craniocaudal dimension, 4.5 mm in AP dimension and 12 to 13 mm  in the maximum transverse dimension. There is no evidence of mass effect  upon the thecal sac. The appearance is nonspecific. This may represent  complex fluid collection/hematoma. An underlying mass or vascular lesion  cannot be excluded. Further evaluation  could be performed with a MRI  examination of the thoracic spine with and without contrast.   Ultimately, a spinal arteriogram may be required.  2. A hemangioma at T10 is noted measuring approximately 1.5 cm in  diameter.     The above information was called to and discussed with Mary Juarez on  12/17/2024 at 1620 hours.     CHECK CHECK .          MRI Cervical Spine Without Contrast [[id number]] Collected: 12/17/24 1632     Updated: 12/17/24 1633    Narrative:      MRI CERVICAL SPINE WO CONTRAST-, MRI THORACIC SPINE WO CONTRAST-     HISTORY:  R>L leg numbness, weakness; M54.9-Dorsalgia, unspecified;  G89.29-Other chronic pain; R52-Pain, unspecified     COMPARISON: CT of the thoracic spine and lumbar spine without contrast     MRI EXAMINATION OF THE CERVICAL SPINE WITHOUT CONTRAST:     FINDINGS: The alignment of the cervical spine is within normal limits.  There is mild disc desiccation at C5-6 with relative preservation of  disc height. Signal intensity within the cord is normal on the sagittal  and axial T2 sequences.     C2-3: There is no evidence of a disc bulge or herniation.     C3-4: There is no evidence of a disc bulge or herniation.     C4-5: There is no evidence of a disc bulge or herniation. Mild facet  degenerative disease is present on the left.     C5-6: A small left paracentral disc osteophyte complex is present which  minimally abuts the ventral surface of the cord. There is no evidence of  cord compression.     C6-7: There is no evidence of a disc bulge or herniation.     C7-T1: There is no evidence of a disc bulge or herniation.     The sagittal T2 sequence demonstrates abnormal signal appreciated  involving the epidural space posteriorly extending to the junction of  the middle and inferior thirds of T2 to the superior aspect of T5. This  measures approximately 5.9 cm in craniocaudal dimension. It measures  approximately 4.5 mm in AP dimension at the level of T3/T4.       Impression:      1.  There  is no evidence of cord signal abnormality or of a cervical  disc herniation. Mild degenerative disease involving the cervical spine  is noted as described above most prominent of which is at C5-6 where a  small left paracentral disc osteophyte complex mildly abuts the ventral  surface of the cord. See above.  2.  There is an area of abnormal signal appreciated involving the  posterior epidural soft tissues extending from T2-T5 (5.9 cm in  craniocaudal dimension and 4.5 mm in maximum AP dimension). This is only  partially visualized. It is heterogeneous in appearance being decreased  in signal intensity on the T1 sequence. On the T2 sequence there is  central signal loss with peripheral T2 hyperintensity. The appearance is  nonspecific. This may represent an epidural fluid collection or  hematoma. An underlying vascular lesion or mass cannot be entirely  excluded. Further evaluation with MRI examination of the thoracic spine  with and without contrast is recommended.        MRI EXAMINATION OF THE THORACIC SPINE WITHOUT CONTRAST:     FINDINGS: The alignment of the thoracic spine is within normal limits. A  moderate size hemangioma is appreciated involving the T10 vertebral body  measuring 1.5 cm in size. Signal intensity within the thoracic cord is  normal on the sagittal and axial T2 sequences. There is no evidence of  disc herniation or of cord compression.     Again identified is abnormal signal in the epidural soft tissues  posterior to the thecal sac extending from the junction of the middle  and inferior thirds of T2 to the superior aspect of T5. This measures  approximately 5.9 cm in craniocaudal dimension and 4.5 mm in maximum AP  dimension. It measures approximately 12 to 13 mm in the maximum  transverse dimension. Cerebrospinal fluid is not effaced ventral and  dorsal to the cord.     IMPRESSION:   1. Again identified is abnormal signal in the epidural soft tissues  posteriorly at the midline extending from  T2-T5 measuring approximately  5.9 cm in craniocaudal dimension, 4.5 mm in AP dimension and 12 to 13 mm  in the maximum transverse dimension. There is no evidence of mass effect  upon the thecal sac. The appearance is nonspecific. This may represent  complex fluid collection/hematoma. An underlying mass or vascular lesion  cannot be excluded. Further evaluation could be performed with a MRI  examination of the thoracic spine with and without contrast.   Ultimately, a spinal arteriogram may be required.  2. A hemangioma at T10 is noted measuring approximately 1.5 cm in  diameter.     The above information was called to and discussed with Mary Juarez on  12/17/2024 at 1620 hours.     CHECK CHECK .                  I have reviewed the medications.     Discharge Medications        ASK your doctor about these medications        Instructions Start Date   acetaminophen 500 MG tablet  Commonly known as: TYLENOL   1,000 mg, Oral, Every 6 Hours PRN      baclofen 10 MG tablet  Commonly known as: LIORESAL   10 mg, Oral, 3 Times Daily PRN      lidocaine 5 %  Commonly known as: LIDODERM   1 patch, Transdermal, Every 24 Hours, Remove & Discard patch within 12 hours or as directed by MD      meloxicam 15 MG tablet  Commonly known as: MOBIC   15 mg, Oral, Daily, As needed for moderate pain      predniSONE 50 MG tablet  Commonly known as: DELTASONE   50 mg, Oral, Daily              ---------------------------------------------------------------------------------------------  Assessment & Plan   Assessment/Problem List    Chronic midline thoracic back pain      Plan:    Acute on chronic back pain, resolved  -MRI cervical and thoracic spine without shows nonspecific fluid collection at T2-T5 recommending MRI with and without contrast of thoracic spine   -MRI thoracic spine with and without shows epidural mass concerning for lymphoproliferative disorder, patient updated with family in room  -Neurosurgery following  -Steroids ordered,  patient refusing as he denies any back pain or neurologic symptoms at this time.  -Multimodal pain regimen  -PT consulted     Hypokalemia:  -Mild, replacement protocol ordered  -Recheck a.m. labs           VTE Prophylaxis:  Mechanical VTE prophylaxis orders are present.       Disposition: Home    Follow-up after Discharge: Neurosurgery    This note will serve as a discharge summary    AMOR Reynolds 12/18/24 04:17 EST    I have worn appropriate PPE during this patient encounter, sanitized my hands both with entering and exiting patient's room.      30 minutes has been spent by [facility] providers in the care of this patient while under observation status

## 2024-12-18 NOTE — SIGNIFICANT NOTE
12/18/24 1048   OTHER   Discipline physical therapist   Therapy Assessment/Plan (PT)   Criteria for Skilled Interventions Met (PT) no;no problems identified which require skilled intervention  (PT observed pt ambulate 200ft independently, no gait/balane deficits. Pt denies n/t. reports intermittent severe thoracic pain however none currently. please re-order as needed pending BRYANNA recommendation.)     Interpretor ipad used.

## 2024-12-18 NOTE — PLAN OF CARE
Problem: Adult Inpatient Plan of Care  Goal: Plan of Care Review  Outcome: Progressing  Flowsheets (Taken 12/18/2024 0005)  Progress: no change  Outcome Evaluation: Patient admitted with back pain. MRI shows abnormal signal in soft tissues T2-T5. CT negative. MRI w contrast pending. NPO. Will see Neuro Sx in AM  Plan of Care Reviewed With: patient  Goal: Patient-Specific Goal (Individualized)  Outcome: Progressing  Goal: Absence of Hospital-Acquired Illness or Injury  Outcome: Progressing  Intervention: Identify and Manage Fall Risk  Recent Flowsheet Documentation  Taken 12/17/2024 2200 by Richard Perez RN  Safety Promotion/Fall Prevention:   activity supervised   clutter free environment maintained   fall prevention program maintained   nonskid shoes/slippers when out of bed   safety round/check completed   room organization consistent  Taken 12/17/2024 2000 by Richard Perez RN  Safety Promotion/Fall Prevention:   activity supervised   clutter free environment maintained  Intervention: Prevent Skin Injury  Recent Flowsheet Documentation  Taken 12/17/2024 2000 by Richard Perez RN  Body Position: position changed independently  Intervention: Prevent Infection  Recent Flowsheet Documentation  Taken 12/17/2024 2200 by Richard Perez RN  Infection Prevention:   rest/sleep promoted   hand hygiene promoted   single patient room provided   personal protective equipment utilized  Taken 12/17/2024 2000 by Richard Perez RN  Infection Prevention:   single patient room provided   rest/sleep promoted  Goal: Optimal Comfort and Wellbeing  Outcome: Progressing  Intervention: Monitor Pain and Promote Comfort  Recent Flowsheet Documentation  Taken 12/17/2024 2000 by Richard Perez RN  Pain Management Interventions:   relaxation techniques promoted   quiet environment facilitated  Intervention: Provide Person-Centered Care  Recent Flowsheet Documentation  Taken 12/17/2024 2000 by Richard Perez RN  Trust Relationship/Rapport: ()    care explained   choices provided  Goal: Readiness for Transition of Care  Outcome: Progressing     Problem: Pain Acute  Goal: Optimal Pain Control and Function  Outcome: Progressing  Intervention: Develop Pain Management Plan  Recent Flowsheet Documentation  Taken 12/17/2024 2000 by Richard Perez RN  Pain Management Interventions:   relaxation techniques promoted   quiet environment facilitated  Intervention: Prevent or Manage Pain  Recent Flowsheet Documentation  Taken 12/17/2024 2000 by Richard Perez RN  Medication Review/Management: medications reviewed   Goal Outcome Evaluation:  Plan of Care Reviewed With: patient        Progress: no change  Outcome Evaluation: Patient admitted with back pain. MRI shows abnormal signal in soft tissues T2-T5. CT negative. MRI w contrast pending. NPO. Will see Neuro Sx in AM

## 2024-12-18 NOTE — PLAN OF CARE
Goal Outcome Evaluation:              Outcome Evaluation: pt was dicharged from the obs unit with all belongings and discharge paperwork, pt to follow up with nuerosurgery as indicated, pt had no further questions at the time of discharge

## 2024-12-19 NOTE — CASE MANAGEMENT/SOCIAL WORK
Case Management Discharge Note      Final Note: DC'd home. Monster RIZZO RN         Selected Continued Care - Discharged on 12/18/2024 Admission date: 12/17/2024 - Discharge disposition: Home or Self Care      Destination    No services have been selected for the patient.                Durable Medical Equipment    No services have been selected for the patient.                Dialysis/Infusion    No services have been selected for the patient.                Home Medical Care    No services have been selected for the patient.                Therapy    No services have been selected for the patient.                Community Resources    No services have been selected for the patient.                Community & DME    No services have been selected for the patient.                    Transportation Services  Private: Car    Final Discharge Disposition Code: 01 - home or self-care

## 2024-12-31 ENCOUNTER — TELEPHONE (OUTPATIENT)
Dept: NEUROSURGERY | Facility: CLINIC | Age: 39
End: 2024-12-31
Payer: COMMERCIAL

## 2024-12-31 NOTE — TELEPHONE ENCOUNTER
LM for patient that the time on his appt with Dr. Prasad on January 6 had to be moved to 2:30 due to a surgery that was added. Asked that if that date and time does not work to call me back.

## 2025-01-08 NOTE — PROGRESS NOTES
"Subjective   Patient ID: Mali Orlando is a 40 y.o. male is here today for follow-up for       R leg weakness, back pain.     Hospital follow up, in ED on 12/17/2024    MRI thoracic/cervical spine completed on 12/17/2024.        Patient reports no complaints today.       [id number] for Italian      HPI:  Since their last visit (see previous note), Mali reports he has not had any of these excruciating back pain episodes since he left the hospital.  In summary, the patient has many years of episodes of midthoracic excruciating back pain that leave him, per his own words, paralyzed and unable to move or speak.  These episodes can last hours to days.  He was seen approximately 1 year ago with these complaints and at that time was found to have a 8 mm lesion in his liver.  After the symptoms self resolved, he left the ER.  He presented again to our ER in December with similar complaints.  An MRI of the thoracic spine was done which showed a nonspecific posterior epidural collection in the thoracic spine.  The MRI was repeated with contrast and it found a heterogenously enhancing epidural mass that is consistent with a myeloproliferative disorder.  The patient did not want to undergo any additional testing at that time as he had to go back to work.  He presents to my office now to discuss these issues.  He is frustrated that we do not have any answers.  He has not had any new episodes of back pain.  He denies any bowel or bladder incontinence..    Review of Systems    Objective     Vitals:    01/13/25 1523   BP: 110/66   BP Location: Right arm   Patient Position: Sitting   Cuff Size: Adult   Pulse: 95   Resp: 16   Temp: 97.5 °F (36.4 °C)   TempSrc: Temporal   SpO2: 96%   Weight: 65.3 kg (144 lb)   Height: 165.1 cm (65\")   PainSc: 0-No pain     Body mass index is 23.96 kg/m².    Lab Results   Component Value Date    WBC 7.95 12/18/2024    HGB 15.1 12/18/2024    HCT 44.9 12/18/2024    MCV 87.5 12/18/2024    PLT " 218 12/18/2024     Lab Results   Component Value Date    GLUCOSE 91 12/18/2024    CALCIUM 8.6 12/18/2024     12/18/2024    K 4.1 12/18/2024    CO2 23.4 12/18/2024     12/18/2024    BUN 13 12/18/2024    CREATININE 0.86 12/18/2024    EGFR 112.3 12/18/2024    BCR 15.1 12/18/2024    ANIONGAP 11.6 12/18/2024       Physical Exam:    NAD  A&O3  Face symmetric, pupils equal round and reactive to light, EOMI  Motor:   5/5 BUE  5/5 BLE  Gait normal  no Farshad's, no clonus, reflexes are 2 out of 5 in biceps and brachioradialis, 2 out of 5 in bilateral patellar's      Assessment & Plan     Mali Orlando  reports that he has never smoked. He has never been exposed to tobacco smoke. He has never used smokeless tobacco.     Independent Review of Radiographic Studies:      I personally reviewed the images from the following studies.    MRI of the thoracic spine performed [facility] on 12/17/2024 with contrast:  There is an enhancing dorsal epidural mass from T2-5 extending into the right greater than left T3-4 and T4-5 neural foramina there is relatively diffuse T1 osseous hypointensity with faint marrow enhancement.  Compatible with lymphoproliferative disorder.    Medical Decision Making:      I spent 50 minutes caring for Mali on this date of service. This time includes time spent by me in the following activities:preparing for the visit, reviewing tests, obtaining and/or reviewing a separately obtained history, performing a medically appropriate examination and/or evaluation , counseling and educating the patient/family/caregiver, ordering medications, tests, or procedures, documenting information in the medical record, independently interpreting results and communicating that information with the patient/family/caregiver, and care coordination    Assessment & Plan  Abnormal MRI, thoracic spine  There is concern that this is a lymphoproliferative disorder and I will be referring him to  hematology and  oncology for further workup and potential treatment.  I do not believe that biopsy of this epidural mass is the best course of action at this time as we do not have any additional imaging of his body and have no working diagnosis.   He has no signs of myelopathy, there is no compression of the spinal cord on imaging.    I will see him back in 1 month or after he sees heme-onc for further workup.  We will likely repeat MRI thoracic with contrast in 6 months       Chronic midline thoracic back pain  I believe that some component of his back pain is due to this mass.  There are no other lesions on his MRI.  He does not have any compression of his thoracic spinal cord.       Liver mass  Will defer to heme-onc regarding workup of this liver mass originally seen on CT of the chest abdomen pelvis in 2023.  Has not been imaged subsequently, patient did not want any workup of this while he is in the hospital but he understands now that it is important to figure out what is going on.  Orders:    Ambulatory Referral to Oncology      Return in about 1 month (around 2/13/2025) for Clinical follow-up without imaging.     This note was completed using Dragon Dictation software.

## 2025-01-13 ENCOUNTER — OFFICE VISIT (OUTPATIENT)
Dept: NEUROSURGERY | Facility: CLINIC | Age: 40
End: 2025-01-13
Payer: COMMERCIAL

## 2025-01-13 ENCOUNTER — TELEPHONE (OUTPATIENT)
Dept: NEUROSURGERY | Facility: CLINIC | Age: 40
End: 2025-01-13

## 2025-01-13 VITALS
HEIGHT: 65 IN | WEIGHT: 144 LBS | SYSTOLIC BLOOD PRESSURE: 110 MMHG | BODY MASS INDEX: 23.99 KG/M2 | DIASTOLIC BLOOD PRESSURE: 66 MMHG | OXYGEN SATURATION: 96 % | HEART RATE: 95 BPM | RESPIRATION RATE: 16 BRPM | TEMPERATURE: 97.5 F

## 2025-01-13 DIAGNOSIS — M54.6 CHRONIC MIDLINE THORACIC BACK PAIN: ICD-10-CM

## 2025-01-13 DIAGNOSIS — R16.0 LIVER MASS: ICD-10-CM

## 2025-01-13 DIAGNOSIS — G89.29 CHRONIC MIDLINE THORACIC BACK PAIN: ICD-10-CM

## 2025-01-13 DIAGNOSIS — R93.7 ABNORMAL MRI, THORACIC SPINE: Primary | ICD-10-CM

## 2025-01-13 PROCEDURE — 99215 OFFICE O/P EST HI 40 MIN: CPT | Performed by: STUDENT IN AN ORGANIZED HEALTH CARE EDUCATION/TRAINING PROGRAM

## 2025-01-13 NOTE — ASSESSMENT & PLAN NOTE
Will defer to heme-onc regarding workup of this liver mass originally seen on CT of the chest abdomen pelvis in 2023.  Has not been imaged subsequently, patient did not want any workup of this while he is in the hospital but he understands now that it is important to figure out what is going on.  Orders:    Ambulatory Referral to Oncology     Breath Sounds equal & clear to percussion & auscultation, no accessory muscle use

## 2025-01-13 NOTE — TELEPHONE ENCOUNTER
Called pt and left vm to see if he can come in earlier for today's appointment.  Anytime before 3:30pm.      Hub okay to read.

## 2025-01-13 NOTE — ASSESSMENT & PLAN NOTE
I believe that some component of his back pain is due to this mass.  There are no other lesions on his MRI.  He does not have any compression of his thoracic spinal cord.

## 2025-01-13 NOTE — ASSESSMENT & PLAN NOTE
There is concern that this is a lymphoproliferative disorder and I will be referring him to  hematology and oncology for further workup and potential treatment.  I do not believe that biopsy of this epidural mass is the best course of action at this time as we do not have any additional imaging of his body and have no working diagnosis.   He has no signs of myelopathy, there is no compression of the spinal cord on imaging.    I will see him back in 1 month or after he sees heme-onc for further workup.  We will likely repeat MRI thoracic with contrast in 6 months

## 2025-01-13 NOTE — TELEPHONE ENCOUNTER
Name: Mali Orlando    Relationship: Self    Best Callback Number: [phone]    HUB PROVIDED THE RELAY MESSAGE FROM THE OFFICE   PATIENT VOICED UNDERSTANDING AND HAS NO FURTHER QUESTIONS AT THIS TIME    ADDITIONAL INFORMATION: PATIENT CALLED AND STATES THAT HE IS UNABLE TO COME EARLIER DUE TO HIS WORK-WILL BE THERE AT REGULAR TIME THANK YOU

## 2025-01-27 ENCOUNTER — TELEPHONE (OUTPATIENT)
Dept: NEUROSURGERY | Facility: CLINIC | Age: 40
End: 2025-01-27
Payer: COMMERCIAL

## 2025-01-27 NOTE — TELEPHONE ENCOUNTER
Please look at Referral to Oncology may have to send to different ocologist. Please advise Dr. Prasad.

## 2025-01-27 NOTE — TELEPHONE ENCOUNTER
Called and spoke to MARY with HUB who did not transfer me over to office after asking several times to be transferred to a referral coordinator.  Per Mary patient needs to be referred to general surgery so imaging can be completed along with a biopsy.  Mary states oncology only sees patients with confirmed caner diagnosis.  Spoke to Dr. Prasad who will put in an updated referral to general surgery.

## 2025-01-27 NOTE — TELEPHONE ENCOUNTER
Caller: Mali Orlando    Relationship: Self    Best call back number: [phone]    What is the medical concern/diagnosis: LIVER MASS    What specialty or service is being requested: ONCOLOGY REFERRAL    What is the provider, practice or medical service name: PLEASE SEE REFERRAL IN PT CHART THAT WAS ROUTED BACK TO OFFICE    Any additional details: PT CALLED WANTED TO KNOW WHY NO ONE HAS REACHED OUT TO HIM ABOUT THE ONCOLOGY REFERRAL.     HUB TRIED TO WT BECAUSE PT UPSET AND WANTED TO SPEAK TO STAFF,  WAS TOLD TO SEND ENCOUNTER.     PLEASE CALL PT TO ADVISE    THANK YOU,

## 2025-01-27 NOTE — TELEPHONE ENCOUNTER
Called and spoke to pt to let him know a referral to general surgery for consultation has been placed and he will get a call for scheduling.  Pt voiced understanding.      Pt needs Khmer .

## 2025-01-28 ENCOUNTER — TELEPHONE (OUTPATIENT)
Dept: GASTROENTEROLOGY | Facility: CLINIC | Age: 40
End: 2025-01-28

## 2025-01-28 ENCOUNTER — TELEPHONE (OUTPATIENT)
Dept: NEUROSURGERY | Facility: CLINIC | Age: 40
End: 2025-01-28
Payer: COMMERCIAL

## 2025-01-28 DIAGNOSIS — R16.0 LIVER MASS: Primary | ICD-10-CM

## 2025-01-28 NOTE — TELEPHONE ENCOUNTER
We received a referral for liver mass and on our workflow we do not have this as something we  would see I spoke w/referring office and cg and she thought I should ask you .     The referring office is aware of what's going on and I have scheduled the  appointment already just in case , since our appointments are scarce

## 2025-01-28 NOTE — TELEPHONE ENCOUNTER
I can see him.  Is there anyone at the referring office I can be directed to to who could order an MRI of the abdomen with and without contrast so I can have some imaging to better look at the liver lesion before he comes in.

## 2025-01-28 NOTE — TELEPHONE ENCOUNTER
Called pt and spoke to pt using  #[phone].  Spent 50 mins on the phone trying to explain to pt he has been scheduled for  MRI abdomen w w/o contrast on 02/4/2025@8am.  And also tried to explain to patient of appointment  with Gastro on 02/24/2025@9;30am.  Patient kept repeating he does not want the liver issue addressed at this time.  He is wanting the upper spine issue addressed.  Patient kept saying he wants to see the blood doctor.  I told patient hematology and oncology only see pts with cancer diagnosis, therefore we are needing to have MRI completed.  After speaking for about 50 minutes patient requested to see Dr. Prasad in person.  I advised patient I will call him back with Dr. Prsaad recommendations (appointment).  Patient does not want to be called before 10am.  He also requested Hematology and Oncology phone number to schedule his own appointment.  Provided patient with phone # [phone].

## 2025-01-28 NOTE — TELEPHONE ENCOUNTER
Briseida from  Gastro called to carmen pt can be seen at their office.  Per provider Isis Castillo PA-C they are needing for our office to order MRI abdomen with and without contrast.  Nela Goins I need to get confirmation from Dr. Prasad before placing orders.      Orders for MRI abdomen placed.  Okay per Dr. Prasad.      Pt is scheduled with gastro on 02/21/2025@11am which is the first appointment available.

## 2025-01-29 DIAGNOSIS — M89.8X8 MASS OF SPINE: Primary | ICD-10-CM

## 2025-02-04 ENCOUNTER — HOSPITAL ENCOUNTER (OUTPATIENT)
Facility: HOSPITAL | Age: 40
Discharge: HOME | End: 2025-02-04
Admitting: STUDENT IN AN ORGANIZED HEALTH CARE EDUCATION/TRAINING PROGRAM
Payer: COMMERCIAL

## 2025-02-04 DIAGNOSIS — R16.0 LIVER MASS: ICD-10-CM

## 2025-02-04 PROCEDURE — 74183 MRI ABD W/O CNTR FLWD CNTR: CPT

## 2025-02-04 PROCEDURE — A9577 INJ MULTIHANCE: HCPCS | Performed by: STUDENT IN AN ORGANIZED HEALTH CARE EDUCATION/TRAINING PROGRAM

## 2025-02-04 PROCEDURE — 25510000002 GADOBENATE DIMEGLUMINE 529 MG/ML SOLUTION: Performed by: STUDENT IN AN ORGANIZED HEALTH CARE EDUCATION/TRAINING PROGRAM

## 2025-02-04 RX ADMIN — GADOBENATE DIMEGLUMINE 14 ML: 529 INJECTION, SOLUTION INTRAVENOUS at 08:43

## 2025-02-07 NOTE — PROGRESS NOTES
"Subjective   Patient ID: Mali Orlando is a 40 y.o. male is here today for follow-up for   -Abnormal MRI, thoracic spine    Follow up, last seen in office on 01/13/2025  MRI abdomen w w/o contrast completed on 02/04/2025    Patient reports no complaints this morning.          Patient presents to my office today to discuss his recent appointments with hematology and gastroenterology.  The MRI of the abdomen did not show any concern for malignancy and supports a diagnosis of benign hemangioma.    Dr. Maravilla with heme-onc discussed with the patient that he does not think that this is a lymphoproliferative disorder but that it may represent some tumor.  Over read by radiology suggested that this may be inflammatory tissue and concern for malignancy is low.  Unfortunately, this is not something that would be easy to get a biopsy of without open surgery and laminectomy.  The patient today says that he is been doing well since December and has had no attacks.  He describes the attacks as a sudden terrible pain in his upper back    That radiates down his back and into his lower legs and makes it difficult for him to breathe.  This has happened 3 times since moving to the  in 2015.  1 was approximately 8 years ago, the 2 most recent events have been in December 2023 in 2024.  These episodes are self-limited.  He does not have any bowel or bladder incontinence during these episodes.  He was taking pain medication that was prescribed him at the hospital but has discontinued it since.  He is very frustrated with all the testing and not having any answers regarding the diagnosis.          Objective     Vitals:    02/24/25 1049   BP: 110/70   BP Location: Left arm   Patient Position: Sitting   Cuff Size: Adult   Pulse: 98   Resp: 16   Temp: 97.8 °F (36.6 °C)   TempSrc: Temporal   SpO2: 95%   Weight: 62.6 kg (138 lb)   Height: 165.1 cm (65\")   PainSc: 0-No pain     Body mass index is 22.96 kg/m².    Lab Results   Component Value Date "    WBC 9.10 02/10/2025    HGB 17.1 02/10/2025    HCT 50.9 02/10/2025    MCV 84.4 02/10/2025     02/10/2025     Lab Results   Component Value Date    GLUCOSE 91 12/18/2024    CALCIUM 8.6 12/18/2024     12/18/2024    K 4.1 12/18/2024    CO2 23.4 12/18/2024     12/18/2024    BUN 13 12/18/2024    CREATININE 0.86 12/18/2024    EGFR 112.3 12/18/2024    BCR 15.1 12/18/2024    ANIONGAP 11.6 12/18/2024         Physical Exam:    NAD  A&O3  Face symmetric,Motor:   5/5 BUE  5/5 BLE  Gait normal  Sensation normal      Assessment & Plan     Mali Orlando  reports that he has never smoked. He has never been exposed to tobacco smoke. He has never used smokeless tobacco.     Independent Review of Radiographic Studies:      I personally reviewed the images from the following studies.    MRI of the thoracic spine with and without contrast performed 12/17/2024 demonstrates a dorsal heterogenous mass from T2-T5 with some enhancement but this is noncompressive and does not explain the patient's symptoms.  Dr. Maravilla discussed these findings with one of our neuroradiologist who does not think that this is a neoplastic process but is likely old inflammatory tissue.    Medical Decision Making:      I spent 50 minutes caring for Mali on this date of service. This time includes time spent by me in the following activities:preparing for the visit, reviewing tests, obtaining and/or reviewing a separately obtained history, performing a medically appropriate examination and/or evaluation , counseling and educating the patient/family/caregiver, ordering medications, tests, or procedures, documenting information in the medical record, independently interpreting results and communicating that information with the patient/family/caregiver, and care coordination          Assessment & Plan  Abnormal MRI, thoracic spine  I explained to the patient that the suspicion for this being a neoplastic lymphoproliferative process is significantly  lower now that we know that the  liver lesion is just a hemangioma and his other labs were normal after review with hematology and oncology.  The dorsal epidural mass from T2-5 is nonspecific and does not look concerning for a compressive or aggressive neoplastic process.  It does not explain the patient's intermittent symptoms over the past 10 years that have only occurred 3 times.  These episodes may be related to severe muscle spasm but as I have not been able to physically examine the patient during these episodes, it is unclear to me about whether this is a central neurologic process versus musculoskeletal.    I did explain to the patient that while this is thought to be unlikely to be cancerous, that he should present to a hospital if he has another episode.  Otherwise the plan will be to repeat imaging in 1 year with and without contrast to assess for change.  Outside of these intermittent self-limiting episodes that occur have occurred 3 times over 10 years, the patient has no complaints and is otherwise healthy.  He is not interested in open biopsy at this time as his primary concern is stopping these episodes of pain, not necessarily a diagnosis of the tissue.  He is hesitant to undergo surgery as this may cause more pain.  I think it is reasonable to monitor this with imaging serially and we will reassess the plan if anything changes in the interim.  Orders:    MRI Thoracic Spine With & Without Contrast; Future      Return in about 1 year (around 2/24/2026) for Follow-up after imaging.       This note was completed using Dragon Dictation software.

## 2025-02-10 ENCOUNTER — LAB (OUTPATIENT)
Dept: LAB | Facility: HOSPITAL | Age: 40
End: 2025-02-10
Payer: COMMERCIAL

## 2025-02-10 ENCOUNTER — CONSULT (OUTPATIENT)
Dept: ONCOLOGY | Facility: CLINIC | Age: 40
End: 2025-02-10
Payer: COMMERCIAL

## 2025-02-10 VITALS
DIASTOLIC BLOOD PRESSURE: 86 MMHG | BODY MASS INDEX: 23.14 KG/M2 | HEIGHT: 65 IN | RESPIRATION RATE: 16 BRPM | HEART RATE: 92 BPM | SYSTOLIC BLOOD PRESSURE: 118 MMHG | OXYGEN SATURATION: 97 % | WEIGHT: 138.9 LBS | TEMPERATURE: 98.1 F

## 2025-02-10 DIAGNOSIS — D47.9 LYMPHOPROLIFERATIVE DISORDER: Primary | ICD-10-CM

## 2025-02-10 DIAGNOSIS — G96.198 EPIDURAL MASS: Primary | ICD-10-CM

## 2025-02-10 LAB
BASOPHILS # BLD AUTO: 0.07 10*3/MM3 (ref 0–0.2)
BASOPHILS NFR BLD AUTO: 0.8 % (ref 0–1.5)
DEPRECATED RDW RBC AUTO: 39.1 FL (ref 37–54)
EOSINOPHIL # BLD AUTO: 0.25 10*3/MM3 (ref 0–0.4)
EOSINOPHIL NFR BLD AUTO: 2.7 % (ref 0.3–6.2)
ERYTHROCYTE [DISTWIDTH] IN BLOOD BY AUTOMATED COUNT: 12.8 % (ref 12.3–15.4)
HCT VFR BLD AUTO: 50.9 % (ref 37.5–51)
HGB BLD-MCNC: 17.1 G/DL (ref 13–17.7)
IMM GRANULOCYTES # BLD AUTO: 0.09 10*3/MM3 (ref 0–0.05)
IMM GRANULOCYTES NFR BLD AUTO: 1 % (ref 0–0.5)
LYMPHOCYTES # BLD AUTO: 1.88 10*3/MM3 (ref 0.7–3.1)
LYMPHOCYTES NFR BLD AUTO: 20.7 % (ref 19.6–45.3)
MCH RBC QN AUTO: 28.4 PG (ref 26.6–33)
MCHC RBC AUTO-ENTMCNC: 33.6 G/DL (ref 31.5–35.7)
MCV RBC AUTO: 84.4 FL (ref 79–97)
MONOCYTES # BLD AUTO: 0.58 10*3/MM3 (ref 0.1–0.9)
MONOCYTES NFR BLD AUTO: 6.4 % (ref 5–12)
NEUTROPHILS NFR BLD AUTO: 6.23 10*3/MM3 (ref 1.7–7)
NEUTROPHILS NFR BLD AUTO: 68.4 % (ref 42.7–76)
NRBC BLD AUTO-RTO: 0.3 /100 WBC (ref 0–0.2)
PLATELET # BLD AUTO: 245 10*3/MM3 (ref 140–450)
PMV BLD AUTO: 11.7 FL (ref 6–12)
RBC # BLD AUTO: 6.03 10*6/MM3 (ref 4.14–5.8)
WBC NRBC COR # BLD AUTO: 9.1 10*3/MM3 (ref 3.4–10.8)

## 2025-02-10 PROCEDURE — 85025 COMPLETE CBC W/AUTO DIFF WBC: CPT

## 2025-02-10 PROCEDURE — 36415 COLL VENOUS BLD VENIPUNCTURE: CPT

## 2025-02-10 NOTE — PROGRESS NOTES
REFERRING PROVIDER:    Elisa Prasad MD  [address] [facility]  [address]  [place] [address]    REASON FOR CONSULTATION:    Concern for lymphoma    HISTORY OF PRESENT ILLNESS:  Mali Orlando is a 40 y.o. male who is referred today for further evaluation of concern for lymphoma.    12/25/2023: CT abdomen and pelvis with CT lumbar spine with 8 mm low-attenuation lesion within the right lobe of the liver, indeterminate.    12/14/2024: CT thoracic spine with no bony abnormalities.    12/17/2024: MRI cervical and thoracic spine with no cord abnormality or cervical disc herniation.  Mild degenerative changes noted.  There is an area of abnormal signal involving the posterior epidural soft tissues from T2-T5 measuring 5.9 cm in craniocaudal dimension.    Patient has been evaluated by Dr. Prasad with neurosurgery.  She requested an MRI of the abdomen which was performed on 2/4/2025.  The radiologist has not reviewed this yet.  She did not believe that a biopsy of the epidural mass was the best course of action when she evaluated him.  He was referred here for further evaluation.    He notes intermittent back pain since 2009.  He states this is only occurred a few times.  He was recently hospitalized with this which is what led to all of the recent imaging.    He denies any pain right now.  He denies any other symptoms.  He notes frustration from getting multiple imaging studies and not an answer as to what has caused his back pain.    His understanding of the problem is that the white blood cells are getting stuck in his back.        Past Medical History:   Diagnosis Date    H/O Finger mass, right     History of back pain        Past Surgical History:   Procedure Laterality Date    FINGER MASS EXCISION Right        SOCIAL HISTORY:   reports that he has never smoked. He has never been exposed to tobacco smoke. He has never used smokeless tobacco. He reports that he does not drink alcohol and does not use drugs.    FAMILY  "HISTORY:  family history is not on file.    ALLERGIES:  No Known Allergies    MEDICATIONS:  The medication list has been reviewed with the patient by the medical assistant, and the list has been updated in the electronic medical record, which I reviewed.  Medication dosages and frequencies were confirmed to be accurate.    Review of Systems   Musculoskeletal:  Positive for back pain (None currently).   All other systems reviewed and are negative.      PHYSICAL EXAMINATION:  Vitals:    02/10/25 0831   BP: 118/86   Pulse: 92   Resp: 16   Temp: 98.1 °F (36.7 °C)   TempSrc: Oral   SpO2: 97%   Weight: 63 kg (138 lb 14.4 oz)   Height: 165.1 cm (65\")   PainSc: 0-No pain       Physical Exam  Vitals and nursing note reviewed.   Constitutional:       General: He is not in acute distress.     Appearance: He is well-developed.   HENT:      Head: Normocephalic and atraumatic. Hair is normal.   Eyes:      General: Lids are normal.      Conjunctiva/sclera: Conjunctivae normal.      Pupils: Pupils are equal.   Neck:      Thyroid: No thyroid mass or thyromegaly.      Vascular: No JVD.   Cardiovascular:      Rate and Rhythm: Normal rate and regular rhythm.      Heart sounds: No murmur heard.     No friction rub. No gallop.   Pulmonary:      Effort: Pulmonary effort is normal. No respiratory distress.      Breath sounds: Normal breath sounds. No wheezing or rales.   Abdominal:      General: There is no distension.      Palpations: Abdomen is soft. There is no hepatomegaly, splenomegaly or mass.      Tenderness: There is no abdominal tenderness. There is no guarding.   Musculoskeletal:         General: No tenderness or deformity. Normal range of motion.      Cervical back: Neck supple.   Lymphadenopathy:      Cervical: No cervical adenopathy.      Upper Body:      Right upper body: No supraclavicular adenopathy.      Left upper body: No supraclavicular adenopathy.   Skin:     General: Skin is warm and dry.      Findings: No rash. "   Neurological:      Mental Status: He is alert and oriented to person, place, and time.   Psychiatric:         Behavior: Behavior normal.         Thought Content: Thought content normal.         Judgment: Judgment normal.         DIAGNOSTIC DATA:  CBC & Differential (02/10/2025 08:26)     IMAGING:    MRI abdomen w wo contrast (02/04/2025 08:52)  MRI Thoracic Spine With & Without Contrast (12/17/2024 23:26)    I personally reviewed MRI T-spine and MRI abdomen images.  There is an epidural mass present from T2-T5.    MRI abdomen radiology interpretation pending but there does seem to be a small mass in the right lobe of the liver.    ASSESSMENT:  This is a 40 y.o. male with:    *Concern for lymphoma  12/25/2023: CT abdomen and pelvis with CT lumbar spine with 8 mm low-attenuation lesion within the right lobe of the liver, indeterminate.  12/14/2024: CT thoracic spine with no bony abnormalities.  12/17/2024: MRI cervical and thoracic spine with no cord abnormality or cervical disc herniation.  Mild degenerative changes noted.  There is an area of abnormal signal involving the posterior epidural soft tissues from T2-T5 measuring 5.9 cm in craniocaudal dimension.  Patient has been evaluated by Dr. Prasad with neurosurgery.  She requested an MRI of the abdomen which was performed on 2/4/2025.  The radiologist has not reviewed this yet.  She did not believe that a biopsy of the epidural mass was the best course of action when she evaluated him.  He was referred here for further evaluation.  2/10/2025: Initial evaluation in the office.  He is asymptomatic.  He is quite frustrated with all of the imaging he has had done and no answers as to what is the cause of his intermittent back pain.    *Small liver mass  Unclear significance.  MRI abdomen radiology interpretation pending.  Scheduled to see gastroenterology in April      PLAN:   I did communicate today with Dr. Etienne with interventional radiology.  He states that the  epidural mass cannot be biopsied percutaneously.  The patient's understanding is that there are white blood cells getting stuck in his back.  I discussed this with him with the assistance of an  on the iPad today.  I told him there is concern for a tumor, specifically lymphoma which is not really like white blood cells getting stuck in his back.  He is getting frustrated with all of the imaging studies he has had done with no answers as to what has caused his intermittent back pain.  I again advised him of the concern for a tumor or mass such as lymphoma.  I advised him that we will need a biopsy of some sort.  Ideally we would get a PET scan but he is in no mood to consent to this at this time  He wants to follow-up with Dr. Prasad which is currently scheduled on 2/24.  Again, if there is concern for lymphoma, I would encourage an FDG PET scan to see if there is anything else that could be biopsied.  Otherwise, we will need tissue from the epidural area which it sounds like would need to be done surgically if possible as percutaneous biopsy is not possible per Dr. Etienne with interventional radiology.  I will follow-up the MRI abdomen results.  He is also scheduled to see gastroenterology in April.  Depending on results, percutaneous liver biopsy can be considered as well.  He can follow-up here as needed once a diagnosis has been confirmed.    I spent 83 minutes in this visit today reviewing his record, communicating with him through the assistance of an  on the iPad, examining him, placing orders, documenting the encounter    The patient states that he understood what I was saying but I did communicate with him today with the assistance of an  on iPad.

## 2025-02-10 NOTE — LETTER
February 10, 2025     TORO Astudillo  [address] [facility] [address]    Patient: Mali Orlando   YOB: 1985   Date of Visit: 2/10/2025     Dear TORO Astudillo:       Thank you for referring Mali Orlando to me for evaluation. Below are the relevant portions of my assessment and plan of care.    If you have questions, please do not hesitate to call me. I look forward to following Mali along with you.         Sincerely,        Riaz Maravilla MD        CC: MD Taiwo Bain Andrew, MD  02/10/25 0929  Sign when Signing Visit  REFERRING PROVIDER:    Elisa Prasad MD  [address] [facility]  [address]  [place] [address]    REASON FOR CONSULTATION:    Concern for lymphoma    HISTORY OF PRESENT ILLNESS:  Mali Orlando is a 40 y.o. male who is referred today for further evaluation of concern for lymphoma.    12/25/2023: CT abdomen and pelvis with CT lumbar spine with 8 mm low-attenuation lesion within the right lobe of the liver, indeterminate.    12/14/2024: CT thoracic spine with no bony abnormalities.    12/17/2024: MRI cervical and thoracic spine with no cord abnormality or cervical disc herniation.  Mild degenerative changes noted.  There is an area of abnormal signal involving the posterior epidural soft tissues from T2-T5 measuring 5.9 cm in craniocaudal dimension.    Patient has been evaluated by Dr. Prasad with neurosurgery.  She requested an MRI of the abdomen which was performed on 2/4/2025.  The radiologist has not reviewed this yet.  She did not believe that a biopsy of the epidural mass was the best course of action when she evaluated him.  He was referred here for further evaluation.    He notes intermittent back pain since 2009.  He states this is only occurred a few times.  He was recently hospitalized with this which is what led to all of the recent imaging.    He denies any pain right now.  He denies any other symptoms.  He notes frustration from getting multiple imaging  "studies and not an answer as to what has caused his back pain.    His understanding of the problem is that the white blood cells are getting stuck in his back.        Past Medical History:   Diagnosis Date   • H/O Finger mass, right    • History of back pain        Past Surgical History:   Procedure Laterality Date   • FINGER MASS EXCISION Right        SOCIAL HISTORY:   reports that he has never smoked. He has never been exposed to tobacco smoke. He has never used smokeless tobacco. He reports that he does not drink alcohol and does not use drugs.    FAMILY HISTORY:  family history is not on file.    ALLERGIES:  No Known Allergies    MEDICATIONS:  The medication list has been reviewed with the patient by the medical assistant, and the list has been updated in the electronic medical record, which I reviewed.  Medication dosages and frequencies were confirmed to be accurate.    Review of Systems   Musculoskeletal:  Positive for back pain (None currently).   All other systems reviewed and are negative.      PHYSICAL EXAMINATION:  Vitals:    02/10/25 0831   BP: 118/86   Pulse: 92   Resp: 16   Temp: 98.1 °F (36.7 °C)   TempSrc: Oral   SpO2: 97%   Weight: 63 kg (138 lb 14.4 oz)   Height: 165.1 cm (65\")   PainSc: 0-No pain       Physical Exam  Vitals and nursing note reviewed.   Constitutional:       General: He is not in acute distress.     Appearance: He is well-developed.   HENT:      Head: Normocephalic and atraumatic. Hair is normal.   Eyes:      General: Lids are normal.      Conjunctiva/sclera: Conjunctivae normal.      Pupils: Pupils are equal.   Neck:      Thyroid: No thyroid mass or thyromegaly.      Vascular: No JVD.   Cardiovascular:      Rate and Rhythm: Normal rate and regular rhythm.      Heart sounds: No murmur heard.     No friction rub. No gallop.   Pulmonary:      Effort: Pulmonary effort is normal. No respiratory distress.      Breath sounds: Normal breath sounds. No wheezing or rales.   Abdominal:      " General: There is no distension.      Palpations: Abdomen is soft. There is no hepatomegaly, splenomegaly or mass.      Tenderness: There is no abdominal tenderness. There is no guarding.   Musculoskeletal:         General: No tenderness or deformity. Normal range of motion.      Cervical back: Neck supple.   Lymphadenopathy:      Cervical: No cervical adenopathy.      Upper Body:      Right upper body: No supraclavicular adenopathy.      Left upper body: No supraclavicular adenopathy.   Skin:     General: Skin is warm and dry.      Findings: No rash.   Neurological:      Mental Status: He is alert and oriented to person, place, and time.   Psychiatric:         Behavior: Behavior normal.         Thought Content: Thought content normal.         Judgment: Judgment normal.         DIAGNOSTIC DATA:  CBC & Differential (02/10/2025 08:26)     IMAGING:    MRI abdomen w wo contrast (02/04/2025 08:52)  MRI Thoracic Spine With & Without Contrast (12/17/2024 23:26)    I personally reviewed MRI T-spine and MRI abdomen images.  There is an epidural mass present from T2-T5.    MRI abdomen radiology interpretation pending but there does seem to be a small mass in the right lobe of the liver.    ASSESSMENT:  This is a 40 y.o. male with:    *Concern for lymphoma  12/25/2023: CT abdomen and pelvis with CT lumbar spine with 8 mm low-attenuation lesion within the right lobe of the liver, indeterminate.  12/14/2024: CT thoracic spine with no bony abnormalities.  12/17/2024: MRI cervical and thoracic spine with no cord abnormality or cervical disc herniation.  Mild degenerative changes noted.  There is an area of abnormal signal involving the posterior epidural soft tissues from T2-T5 measuring 5.9 cm in craniocaudal dimension.  Patient has been evaluated by Dr. Prasad with neurosurgery.  She requested an MRI of the abdomen which was performed on 2/4/2025.  The radiologist has not reviewed this yet.  She did not believe that a biopsy of  the epidural mass was the best course of action when she evaluated him.  He was referred here for further evaluation.  2/10/2025: Initial evaluation in the office.  He is asymptomatic.  He is quite frustrated with all of the imaging he has had done and no answers as to what is the cause of his intermittent back pain.    *Small liver mass  Unclear significance.  MRI abdomen radiology interpretation pending.  Scheduled to see gastroenterology in April      PLAN:   I did communicate today with Dr. Etienne with interventional radiology.  He states that the epidural mass cannot be biopsied percutaneously.  The patient's understanding is that there are white blood cells getting stuck in his back.  I discussed this with him with the assistance of an  on the iPad today.  I told him there is concern for a tumor, specifically lymphoma which is not really like white blood cells getting stuck in his back.  He is getting frustrated with all of the imaging studies he has had done with no answers as to what has caused his intermittent back pain.  I again advised him of the concern for a tumor or mass such as lymphoma.  I advised him that we will need a biopsy of some sort.  Ideally we would get a PET scan but he is in no mood to consent to this at this time  He wants to follow-up with Dr. Prasad which is currently scheduled on 2/24.  Again, if there is concern for lymphoma, I would encourage an FDG PET scan to see if there is anything else that could be biopsied.  Otherwise, we will need tissue from the epidural area which it sounds like would need to be done surgically if possible as percutaneous biopsy is not possible per Dr. Etienne with interventional radiology.  I will follow-up the MRI abdomen results.  He is also scheduled to see gastroenterology in April.  Depending on results, percutaneous liver biopsy can be considered as well.  He can follow-up here as needed once a diagnosis has been confirmed.    I spent 83  minutes in this visit today reviewing his record, communicating with him through the assistance of an  on the iPad, examining him, placing orders, documenting the encounter    The patient states that he understood what I was saying but I did communicate with him today with the assistance of an  on iPad.

## 2025-02-20 NOTE — TELEPHONE ENCOUNTER
"I spoke w/Dr Prasad\"s medical assistant Paulina and she is going to relay your message about the MRI . Dr Prasad is not in office today but she will have her reach out to you .   " risk factors

## 2025-02-24 ENCOUNTER — OFFICE VISIT (OUTPATIENT)
Dept: NEUROSURGERY | Facility: CLINIC | Age: 40
End: 2025-02-24
Payer: COMMERCIAL

## 2025-02-24 VITALS
HEART RATE: 98 BPM | SYSTOLIC BLOOD PRESSURE: 110 MMHG | DIASTOLIC BLOOD PRESSURE: 70 MMHG | TEMPERATURE: 97.8 F | HEIGHT: 65 IN | RESPIRATION RATE: 16 BRPM | WEIGHT: 138 LBS | BODY MASS INDEX: 22.99 KG/M2 | OXYGEN SATURATION: 95 %

## 2025-02-24 DIAGNOSIS — R93.7 ABNORMAL MRI, THORACIC SPINE: Primary | ICD-10-CM

## 2025-02-24 PROCEDURE — 99215 OFFICE O/P EST HI 40 MIN: CPT | Performed by: STUDENT IN AN ORGANIZED HEALTH CARE EDUCATION/TRAINING PROGRAM

## 2025-02-24 NOTE — ASSESSMENT & PLAN NOTE
I explained to the patient that the suspicion for this being a neoplastic lymphoproliferative process is significantly lower now that we know that the  liver lesion is just a hemangioma and his other labs were normal after review with hematology and oncology.  The dorsal epidural mass from T2-5 is nonspecific and does not look concerning for a compressive or aggressive neoplastic process.  It does not explain the patient's intermittent symptoms over the past 10 years that have only occurred 3 times.  These episodes may be related to severe muscle spasm but as I have not been able to physically examine the patient during these episodes, it is unclear to me about whether this is a central neurologic process versus musculoskeletal.    I did explain to the patient that while this is thought to be unlikely to be cancerous, that he should present to a hospital if he has another episode.  Otherwise the plan will be to repeat imaging in 1 year with and without contrast to assess for change.  Outside of these intermittent self-limiting episodes that occur have occurred 3 times over 10 years, the patient has no complaints and is otherwise healthy.  He is not interested in open biopsy at this time as his primary concern is stopping these episodes of pain, not necessarily a diagnosis of the tissue.  He is hesitant to undergo surgery as this may cause more pain.  I think it is reasonable to monitor this with imaging serially and we will reassess the plan if anything changes in the interim.  Orders:    MRI Thoracic Spine With & Without Contrast; Future